# Patient Record
Sex: FEMALE | Race: OTHER | HISPANIC OR LATINO | URBAN - METROPOLITAN AREA
[De-identification: names, ages, dates, MRNs, and addresses within clinical notes are randomized per-mention and may not be internally consistent; named-entity substitution may affect disease eponyms.]

---

## 2019-01-23 ENCOUNTER — OUTPATIENT (OUTPATIENT)
Dept: OUTPATIENT SERVICES | Facility: HOSPITAL | Age: 39
LOS: 1 days | Discharge: HOME | End: 2019-01-23

## 2019-01-23 DIAGNOSIS — M25.561 PAIN IN RIGHT KNEE: ICD-10-CM

## 2021-05-03 PROBLEM — Z00.00 ENCOUNTER FOR PREVENTIVE HEALTH EXAMINATION: Status: ACTIVE | Noted: 2021-05-03

## 2021-05-04 ENCOUNTER — NON-APPOINTMENT (OUTPATIENT)
Age: 41
End: 2021-05-04

## 2021-05-05 ENCOUNTER — NON-APPOINTMENT (OUTPATIENT)
Age: 41
End: 2021-05-05

## 2021-05-05 ENCOUNTER — RESULT CHARGE (OUTPATIENT)
Age: 41
End: 2021-05-05

## 2021-05-05 ENCOUNTER — OUTPATIENT (OUTPATIENT)
Dept: OUTPATIENT SERVICES | Facility: HOSPITAL | Age: 41
LOS: 1 days | Discharge: HOME | End: 2021-05-05

## 2021-05-05 ENCOUNTER — APPOINTMENT (OUTPATIENT)
Dept: OBGYN | Facility: CLINIC | Age: 41
End: 2021-05-05
Payer: MEDICAID

## 2021-05-05 VITALS
BODY MASS INDEX: 29.64 KG/M2 | DIASTOLIC BLOOD PRESSURE: 90 MMHG | SYSTOLIC BLOOD PRESSURE: 134 MMHG | WEIGHT: 147 LBS | HEIGHT: 59 IN

## 2021-05-05 DIAGNOSIS — Z34.90 ENCOUNTER FOR SUPERVISION OF NORMAL PREGNANCY, UNSPECIFIED, UNSPECIFIED TRIMESTER: ICD-10-CM

## 2021-05-05 LAB
BILIRUB UR QL STRIP: NORMAL
CLARITY UR: CLEAR
COLLECTION METHOD: NORMAL
GLUCOSE BLDC GLUCOMTR-MCNC: 149
GLUCOSE BLDC GLUCOMTR-MCNC: 149 MG/DL — HIGH (ref 70–99)
GLUCOSE UR-MCNC: 100
HCG UR QL: 0.2 EU/DL
HCG UR QL: POSITIVE
HGB UR QL STRIP.AUTO: NORMAL
KETONES UR-MCNC: NORMAL
LEUKOCYTE ESTERASE UR QL STRIP: NORMAL
NITRITE UR QL STRIP: NORMAL
PH UR STRIP: 6
PROT UR STRIP-MCNC: NORMAL
QUALITY CONTROL: YES
SP GR UR STRIP: 1.02

## 2021-05-05 PROCEDURE — 76815 OB US LIMITED FETUS(S): CPT | Mod: 26

## 2021-05-05 PROCEDURE — 99204 OFFICE O/P NEW MOD 45 MIN: CPT | Mod: 25

## 2021-05-05 RX ORDER — ASPIRIN 81 MG/1
81 TABLET, CHEWABLE ORAL DAILY
Qty: 60 | Refills: 4 | Status: ACTIVE | COMMUNITY
Start: 2021-05-05 | End: 1900-01-01

## 2021-05-06 LAB
ABO + RH PNL BLD: NORMAL
BASOPHILS # BLD AUTO: 0.02 K/UL
BASOPHILS NFR BLD AUTO: 0.3 %
BLD GP AB SCN SERPL QL: NORMAL
C TRACH RRNA SPEC QL NAA+PROBE: NOT DETECTED
EOSINOPHIL # BLD AUTO: 0.08 K/UL
EOSINOPHIL NFR BLD AUTO: 1.1 %
GLUCOSE 1H P 50 G GLC PO SERPL-MCNC: 209 MG/DL
HCT VFR BLD CALC: 39.3 %
HGB BLD-MCNC: 12.9 G/DL
IMM GRANULOCYTES NFR BLD AUTO: 0.1 %
LYMPHOCYTES # BLD AUTO: 1.4 K/UL
LYMPHOCYTES NFR BLD AUTO: 18.9 %
MAN DIFF?: NORMAL
MCHC RBC-ENTMCNC: 29.6 PG
MCHC RBC-ENTMCNC: 32.8 G/DL
MCV RBC AUTO: 90.1 FL
MONOCYTES # BLD AUTO: 0.59 K/UL
MONOCYTES NFR BLD AUTO: 8 %
N GONORRHOEA RRNA SPEC QL NAA+PROBE: NOT DETECTED
NEUTROPHILS # BLD AUTO: 5.32 K/UL
NEUTROPHILS NFR BLD AUTO: 71.6 %
PLATELET # BLD AUTO: 256 K/UL
RBC # BLD: 4.36 M/UL
RBC # FLD: 13.9 %
SOURCE AMPLIFICATION: NORMAL
WBC # FLD AUTO: 7.42 K/UL

## 2021-05-07 ENCOUNTER — NON-APPOINTMENT (OUTPATIENT)
Age: 41
End: 2021-05-07

## 2021-05-07 LAB
HBV SURFACE AG SER QL: NONREACTIVE
HGB A MFR BLD: 97.4 %
HGB A2 MFR BLD: 2.6 %
HGB FRACT BLD-IMP: NORMAL
HIV1+2 AB SPEC QL IA.RAPID: NONREACTIVE
HPV HIGH+LOW RISK DNA PNL CVX: NOT DETECTED
LEAD BLD-MCNC: 2 UG/DL
MEV IGG FLD QL IA: 18.7 AU/ML
MEV IGG+IGM SER-IMP: POSITIVE
RUBV IGG FLD-ACNC: 5.8 INDEX
RUBV IGG SER-IMP: POSITIVE
T PALLIDUM AB SER QL IA: NEGATIVE
VZV AB TITR SER: POSITIVE
VZV IGG SER IF-ACNC: 170.6 INDEX

## 2021-05-09 LAB
BACTERIA UR CULT: ABNORMAL
M TB IFN-G BLD-IMP: NEGATIVE
QUANTIFERON TB PLUS MITOGEN MINUS NIL: 8.15 IU/ML
QUANTIFERON TB PLUS NIL: 0.02 IU/ML
QUANTIFERON TB PLUS TB1 MINUS NIL: 0 IU/ML
QUANTIFERON TB PLUS TB2 MINUS NIL: 0 IU/ML

## 2021-05-09 RX ORDER — AMPICILLIN 500 MG/1
500 CAPSULE ORAL 4 TIMES DAILY
Qty: 28 | Refills: 0 | Status: ACTIVE | COMMUNITY
Start: 2021-05-09 | End: 1900-01-01

## 2021-05-10 ENCOUNTER — NON-APPOINTMENT (OUTPATIENT)
Age: 41
End: 2021-05-10

## 2021-05-11 LAB — FMR1 GENE MUT ANL BLD/T: NORMAL

## 2021-05-12 ENCOUNTER — NON-APPOINTMENT (OUTPATIENT)
Age: 41
End: 2021-05-12

## 2021-05-13 ENCOUNTER — APPOINTMENT (OUTPATIENT)
Dept: ANTEPARTUM | Facility: CLINIC | Age: 41
End: 2021-05-13
Payer: MEDICAID

## 2021-05-13 ENCOUNTER — APPOINTMENT (OUTPATIENT)
Dept: ANTEPARTUM | Facility: CLINIC | Age: 41
End: 2021-05-13
Payer: SELF-PAY

## 2021-05-13 ENCOUNTER — OUTPATIENT (OUTPATIENT)
Dept: OUTPATIENT SERVICES | Facility: HOSPITAL | Age: 41
LOS: 1 days | Discharge: HOME | End: 2021-05-13

## 2021-05-13 ENCOUNTER — RESULT CHARGE (OUTPATIENT)
Age: 41
End: 2021-05-13

## 2021-05-13 ENCOUNTER — APPOINTMENT (OUTPATIENT)
Dept: NUTRITION | Facility: CLINIC | Age: 41
End: 2021-05-13

## 2021-05-13 VITALS
WEIGHT: 146.44 LBS | BODY MASS INDEX: 29.58 KG/M2 | DIASTOLIC BLOOD PRESSURE: 81 MMHG | SYSTOLIC BLOOD PRESSURE: 133 MMHG | HEART RATE: 99 BPM | OXYGEN SATURATION: 100 % | TEMPERATURE: 97.8 F

## 2021-05-13 DIAGNOSIS — Z78.9 OTHER SPECIFIED HEALTH STATUS: ICD-10-CM

## 2021-05-13 DIAGNOSIS — Z3A.15 15 WEEKS GESTATION OF PREGNANCY: ICD-10-CM

## 2021-05-13 DIAGNOSIS — O09.522 SUPERVISION OF ELDERLY MULTIGRAVIDA, SECOND TRIMESTER: ICD-10-CM

## 2021-05-13 DIAGNOSIS — O24.419 GESTATIONAL DIABETES MELLITUS IN PREGNANCY, UNSPECIFIED CONTROL: ICD-10-CM

## 2021-05-13 LAB
BILIRUB UR QL STRIP: NEGATIVE
CLARIM 15Q11.2: NORMAL
CLARIM 1P36: NORMAL
CLARIM 22Q11.2: NORMAL
CLARIM 4P-/WOLF-HIRSCHHORN: NORMAL
CLARIM 5P-/CRI DU CHAT: NORMAL
CLARIM ADDITIONAL INFO: NORMAL
CLARIM CHROMOSOME 13: NORMAL
CLARIM CHROMOSOME 18: NORMAL
CLARIM CHROMOSOME 21: NORMAL
CLARIM SEX CHROMOSOMES: NORMAL
CLARITEST NIPT W/MICRO: NORMAL
CLARITY UR: CLEAR
COLLECTION METHOD: NORMAL
FETAL HEART DESCRIPTION: NORMAL
FETAL HEART RATE (BPM): 142
FETAL MOVEMENT: PRESENT
GLUCOSE BLDC GLUCOMTR-MCNC: 173
GLUCOSE BLDC GLUCOMTR-MCNC: 173 MG/DL — HIGH (ref 70–99)
GLUCOSE UR-MCNC: NEGATIVE
HCG UR QL: 0.2 EU/DL
HGB UR QL STRIP.AUTO: NEGATIVE
KETONES UR-MCNC: NORMAL
LEUKOCYTE ESTERASE UR QL STRIP: NEGATIVE
NITRITE UR QL STRIP: NEGATIVE
OB COMMENTS: NORMAL
PH UR STRIP: 6
PROT UR STRIP-MCNC: NEGATIVE
SCHEDULED VISIT: YES
SP GR UR STRIP: 1.01
URINE ALBUMIN/PROTEIN: NEGATIVE
URINE GLUCOSE: NEGATIVE
URINE KETONES: NORMAL
WEEKS GESTATION: 15.6

## 2021-05-13 PROCEDURE — ZZZZZ: CPT

## 2021-05-13 PROCEDURE — 99244 OFF/OP CNSLTJ NEW/EST MOD 40: CPT | Mod: 25

## 2021-05-13 PROCEDURE — 76816 OB US FOLLOW-UP PER FETUS: CPT | Mod: 26

## 2021-05-13 RX ORDER — BLOOD SUGAR DIAGNOSTIC
STRIP MISCELLANEOUS
Qty: 120 | Refills: 0 | Status: ACTIVE | COMMUNITY
Start: 2021-05-13 | End: 1900-01-01

## 2021-05-13 RX ORDER — ISOPROPYL ALCOHOL 70 ML/100ML
SWAB TOPICAL
Qty: 30 | Refills: 2 | Status: ACTIVE | COMMUNITY
Start: 2021-05-13 | End: 1900-01-01

## 2021-05-13 RX ORDER — LANCETS 33 GAUGE
EACH MISCELLANEOUS
Qty: 120 | Refills: 5 | Status: ACTIVE | COMMUNITY
Start: 2021-05-13 | End: 1900-01-01

## 2021-05-13 RX ORDER — BLOOD-GLUCOSE METER
W/DEVICE EACH MISCELLANEOUS
Qty: 1 | Refills: 0 | Status: ACTIVE | COMMUNITY
Start: 2021-05-13 | End: 1900-01-01

## 2021-05-13 NOTE — DISCUSSION/SUMMARY
[FreeTextEntry1] : 41yo  at 15w6d, dated by LMP 21, AMILCAR 10/27/21 dated by LMP, consistent with 2nd TM sonogram, consult from Dr. Wheeler for Diabetes in pregnancy. \par \par 1. Diabetes in Pregnancy\par -FS today 173 (1hr postprandial) \par -Gestational vs Pregestational, Early GCT of 209 \par - diabetic teaching today\par -nutrition counseling apt to be scheduled \par - Discussed risks of GDM including but not limited to: macrosomia, shoulder dystocia, increased risk of , stillbirth, NICU admission for hypoglycemia and jaundice, and preeclampsia. Explained that if sugars are well controlled the above complications decrease. \par - FS goal fasting <95, 2 hour PP<120. Encouraged to document FS. \par - Discussed increased risk of type II DM later in life and that we will screen for that after pregnancy. \par \par 2. AMA \par - Discussed increased risk of developing GDM, preeclampsia, placental abruption, which could result in  birth or need for  delivery. \par - Discussed increased risk of genetic abnormalities, s/p low risk NIPTs\par - genetic counseling offered, declined \par \par 3. Questionable history of shoulder dystocia in P1\par \par 4. GBS Bacteruria\par -2021 positive urine culture GBS \par -taking ampicillin daily for UTI\par \par 5. Pregnancy \par -Taking ASA daily, continue \par -O POS\par [ ] official sonogram today \par [ ] recommend MSAFP \par [x] Follows with Dr. Wheeler, next apt 2021 \par \par PTL precautions given \par f/u in 2 weeks with finger sticks\par s/p fingerstick teaching today \par \par [place] Int #[id number]

## 2021-05-13 NOTE — OB HISTORY
[Pregnancy History] : boy [___] : Delivery occurred at [unfilled] [LMP: ___] : LMP: [unfilled] [AMILCAR: ___] : AMILCAR: [unfilled]

## 2021-05-13 NOTE — SURGICAL HISTORY
[Last Pap: ___] : Last Pap: [unfilled] [Fibroids] : no fibroids [Abn Paps] : no abnormal pap smears [Breast Disease] : no breast disease [STI's] : no STI's [Infertility] : no infertility

## 2021-05-13 NOTE — PHYSICAL EXAM
[FreeTextEntry1] : General: In no apparent distress. \par HEENT:  Atraumatic.  Extraocular muscles intact.  \par Thyroid: No goiter\par Cardiovascular: Regular rate and rhythm, normal S1/S2 \par Pulmonary: Clear to auscultation bilaterally.  No wheezing. \par Abdomen: soft, gravid, nontender, nondistended, no rebound, no guarding \par Extremities: no calf tenderness or edema \par Neurology: +2 reflexes in bilateral upper extremities \par Psychiatry:  Happy mood.  Awake, alert, oriented to person, place, time.

## 2021-05-13 NOTE — END OF VISIT
[FreeTextEntry3] : [facility] Staff\par \par \par [place] Int #[id number]\par \par I saw and evaluated Ms. HIGGINS with Dr. Gerard and I agree with the documentation above.   I modified the note above (if indicated) and agree with its contents in the present form.\par \par Counseling: \par \par 1.  The patient will be evaluated by a nutritionist and instructed in adhering to a diabetic diet.\par \par 2.  She was counseled by a nurse and instructed in capillary blood glucose testing (fasting and 2 hours after each meal).\par \par 3.  The significance and usual management of diabetes in pregnancy were reviewed, including risks of preeclampsia, Intra-Uterine Fetal Demise, macrosomia, birth trauma, pre-term labor,  section, NICU admission (the main reasons include  hypoglycemia and  jaundice) and the possible need for insulin therapy.\par \par 4.  Exercise was encouraged.  Ideally, she should walk briskly  after each meal.\par \par Recommendations:\par \par 1.  Glycemic Control.  Target glucose ranges to minimize excessive fetal growth are:  Fasting 60-90 mg/dl; preprandial  mg/dl; and 2-hour postprandial < 120 mg/dl.  If these values are elevated, insulin therapy is encouraged, although oral hypoglycemic agents are reasonable to try depending on the finger stick log.\par \par 2.  Antepartum testing.  Daily fetal movement counts are recommended from 28 weeks.  Weekly or twice weekly biophysical profiles are recommended, the timing will depend on glucose control.  Ultrasound assessment of fetal growth and macrosomic features is recommended.\par \par 3.  Timing of Delivery.  If spontaneous delivery has not occurred by 39 weeks gestation, delivery may be planned between 39-40 weeks.   If complications, such as preeclampsia, macrosomia or poor glucose control develop, then delivery plans may need to be revised.\par \par 4.  Route of delivery.  Diabetes is associated with about a six-fold risk for shoulder dystocia.  Operative vaginal deliveries should be approached with caution if at all.\par \par 5.  Glucose control in labor.  During labor, capillary glucose values should be checked every few hours (depending on their stability).  Insulin may be required to cover elevated glucose levels.  This was not discussed today with the patient.\par \par 6.  Long-term diabetes surveillance.  The risk of developing diabetes outside of pregnancy over the next five years may be as high as 50%.  I recommend that she have a fasting plasma glucose at 6 to 12 weeks postpartum and counseling about ways to minimize her risk.  If her fasting glucose is normal, annual glucose screening by her primary care physician is advised.\par \par FS today was 173.\par AMA - Declines genetic counseling.\par Prenatal care is with Dr. Wheeler.\par RTC 2 weeks with fingerstick log.\par \par Ms. Singh expressed understanding and all of her questions were answered to her satisfaction.  Thank you for this consult.\par \par Jovan Pham MD\par \par \par \par Ms. Higgins expressed understanding and all of her questions were answered to her satisfaction.  Thank you for this consult.\par \par Jovan Pham MD\par \par \par \par \par \par \par \par

## 2021-05-13 NOTE — HISTORY OF PRESENT ILLNESS
[FreeTextEntry1] : 39yo  at 15w6d, dated by LMP 21, consistent with 2nd TM sonogram, consult from Dr. Wheeler for Diabetes in pregnancy. \par \par Early GCT of 209\par \par [place] Int #[id number]

## 2021-05-14 DIAGNOSIS — O24.419 GESTATIONAL DIABETES MELLITUS IN PREGNANCY, UNSPECIFIED CONTROL: ICD-10-CM

## 2021-05-14 LAB — AR GENE MUT ANL BLD/T: NORMAL

## 2021-05-17 ENCOUNTER — APPOINTMENT (OUTPATIENT)
Dept: OBGYN | Facility: CLINIC | Age: 41
End: 2021-05-17

## 2021-05-17 ENCOUNTER — NON-APPOINTMENT (OUTPATIENT)
Age: 41
End: 2021-05-17

## 2021-05-19 LAB — CYTOLOGY CVX/VAG DOC THIN PREP: NORMAL

## 2021-05-22 LAB — CFTR MUT TESTED BLD/T: NEGATIVE

## 2021-05-26 ENCOUNTER — NON-APPOINTMENT (OUTPATIENT)
Age: 41
End: 2021-05-26

## 2021-05-27 ENCOUNTER — OUTPATIENT (OUTPATIENT)
Dept: OUTPATIENT SERVICES | Facility: HOSPITAL | Age: 41
LOS: 1 days | Discharge: HOME | End: 2021-05-27

## 2021-05-27 ENCOUNTER — APPOINTMENT (OUTPATIENT)
Dept: OBGYN | Facility: CLINIC | Age: 41
End: 2021-05-27

## 2021-05-27 ENCOUNTER — APPOINTMENT (OUTPATIENT)
Dept: ANTEPARTUM | Facility: CLINIC | Age: 41
End: 2021-05-27
Payer: MEDICAID

## 2021-05-27 ENCOUNTER — RESULT CHARGE (OUTPATIENT)
Age: 41
End: 2021-05-27

## 2021-05-27 VITALS
SYSTOLIC BLOOD PRESSURE: 103 MMHG | TEMPERATURE: 98.2 F | HEART RATE: 80 BPM | DIASTOLIC BLOOD PRESSURE: 65 MMHG | BODY MASS INDEX: 28.96 KG/M2 | OXYGEN SATURATION: 100 % | WEIGHT: 143.38 LBS

## 2021-05-27 DIAGNOSIS — O24.410 GESTATIONAL DIABETES MELLITUS IN PREGNANCY, DIET CONTROLLED: ICD-10-CM

## 2021-05-27 DIAGNOSIS — Z3A.18 18 WEEKS GESTATION OF PREGNANCY: ICD-10-CM

## 2021-05-27 LAB
BILIRUB UR QL STRIP: NEGATIVE
CLARITY UR: CLEAR
COLLECTION METHOD: NORMAL
FETAL HEART DESCRIPTION: NORMAL
FETAL HEART RATE (BPM): 140
GLUCOSE BLDC GLUCOMTR-MCNC: 85
GLUCOSE BLDC GLUCOMTR-MCNC: 85 MG/DL — SIGNIFICANT CHANGE UP (ref 70–99)
GLUCOSE UR-MCNC: NEGATIVE
HCG UR QL: 0.2 EU/DL
HGB UR QL STRIP.AUTO: NEGATIVE
KETONES UR-MCNC: NEGATIVE
LEUKOCYTE ESTERASE UR QL STRIP: NEGATIVE
NITRITE UR QL STRIP: NEGATIVE
OB COMMENTS: NORMAL
PH UR STRIP: 7.5
PROT UR STRIP-MCNC: NEGATIVE
SCHEDULED VISIT: YES
SP GR UR STRIP: 1
URINE ALBUMIN/PROTEIN: NEGATIVE
URINE GLUCOSE: NEGATIVE
URINE KETONES: NEGATIVE
WEEKS GESTATION: 18.1

## 2021-05-27 PROCEDURE — 99213 OFFICE O/P EST LOW 20 MIN: CPT

## 2021-05-27 NOTE — DISCUSSION/SUMMARY
[FreeTextEntry1] :  number [employer] [id number]\par \par 1. Diabetes in Pregnancy\par -FS today 85\par - FS log , all normal except for 1 value, 2h PP , almost all normal\par -Gestational vs Pregestational, Early GCT of 209 \par - diabetic teaching today with Tyra\par -nutrition counseling apt to be scheduled \par - Discussed risks of GDM including but not limited to: macrosomia, shoulder dystocia, increased risk of , stillbirth, NICU admission for hypoglycemia and jaundice, and preeclampsia. Explained that if sugars are well controlled the above complications decrease. \par - FS goal fasting <95, 2 hour PP<120. Encouraged to document FS. \par - Discussed increased risk of type II DM later in life and that we will screen for that after pregnancy. \par \par 2. AMA \par - Discussed increased risk of developing GDM, preeclampsia, placental abruption, which could result in  birth or need for  delivery. \par - Discussed increased risk of genetic abnormalities, s/p low risk NIPTs\par - genetic counseling offered, declined \par \par 3. Questionable history of shoulder dystocia in P1\par \par 4. Pregnancy \par -Taking ASA 81 mg PO daily, continue \par GBS bacteruria\par recommend MSAFP \par Follows with Dr. Wheeler, next apt 2021 \par Anatomy scan scheduled for \par \par PTL precautions given \par s/p fingerstick teaching today \par May return to [facility] on  with her anatomy scan\par \par

## 2021-05-27 NOTE — PHYSICAL EXAM
[FreeTextEntry1] : General: In no apparent distress. \par HEENT:  Atraumatic.  \par Cardiovascular: Regular rate and rhythm, normal S1/S2 \par Pulmonary: Clear to auscultation bilaterally.  No wheezing. \par Abdomen: soft, gravid, nontender, nondistended, no rebound, no guarding \par Extremities: no calf tenderness or edema \par Neurology: +2 reflexes in bilateral upper extremities \par Psychiatry:  Happy mood.  Awake, alert, oriented to person, place, time.

## 2021-05-27 NOTE — HISTORY OF PRESENT ILLNESS
[FreeTextEntry1] : 41yo  at 18w1d, dated by LMP 21, consistent with 2nd trimester sonogram, referred by Dr. Wheeler for Diabetes in pregnancy. \par \par Early GCT of 209\par \par

## 2021-06-02 ENCOUNTER — OUTPATIENT (OUTPATIENT)
Dept: OUTPATIENT SERVICES | Facility: HOSPITAL | Age: 41
LOS: 1 days | Discharge: HOME | End: 2021-06-02

## 2021-06-02 ENCOUNTER — NON-APPOINTMENT (OUTPATIENT)
Age: 41
End: 2021-06-02

## 2021-06-02 ENCOUNTER — RESULT CHARGE (OUTPATIENT)
Age: 41
End: 2021-06-02

## 2021-06-02 ENCOUNTER — APPOINTMENT (OUTPATIENT)
Dept: OBGYN | Facility: CLINIC | Age: 41
End: 2021-06-02
Payer: MEDICAID

## 2021-06-02 VITALS — BODY MASS INDEX: 28.68 KG/M2 | DIASTOLIC BLOOD PRESSURE: 60 MMHG | WEIGHT: 142 LBS | SYSTOLIC BLOOD PRESSURE: 100 MMHG

## 2021-06-02 LAB
BILIRUB UR QL STRIP: NEGATIVE
CLARITY UR: CLEAR
COLLECTION METHOD: NORMAL
GLUCOSE UR-MCNC: NEGATIVE
HCG UR QL: NEGATIVE EU/DL
HGB UR QL STRIP.AUTO: NEGATIVE
KETONES UR-MCNC: NORMAL
LEUKOCYTE ESTERASE UR QL STRIP: NEGATIVE
NITRITE UR QL STRIP: NEGATIVE
PH UR STRIP: 6
PROT UR STRIP-MCNC: NEGATIVE
SP GR UR STRIP: 1.02

## 2021-06-02 PROCEDURE — 99213 OFFICE O/P EST LOW 20 MIN: CPT

## 2021-06-03 ENCOUNTER — LABORATORY RESULT (OUTPATIENT)
Age: 41
End: 2021-06-03

## 2021-06-03 ENCOUNTER — APPOINTMENT (OUTPATIENT)
Dept: ANTEPARTUM | Facility: CLINIC | Age: 41
End: 2021-06-03

## 2021-06-15 DIAGNOSIS — Z34.90 ENCOUNTER FOR SUPERVISION OF NORMAL PREGNANCY, UNSPECIFIED, UNSPECIFIED TRIMESTER: ICD-10-CM

## 2021-06-18 ENCOUNTER — ASOB RESULT (OUTPATIENT)
Age: 41
End: 2021-06-18

## 2021-06-18 ENCOUNTER — RESULT CHARGE (OUTPATIENT)
Age: 41
End: 2021-06-18

## 2021-06-18 ENCOUNTER — APPOINTMENT (OUTPATIENT)
Dept: ANTEPARTUM | Facility: CLINIC | Age: 41
End: 2021-06-18
Payer: MEDICAID

## 2021-06-18 ENCOUNTER — OUTPATIENT (OUTPATIENT)
Dept: OUTPATIENT SERVICES | Facility: HOSPITAL | Age: 41
LOS: 1 days | Discharge: HOME | End: 2021-06-18

## 2021-06-18 VITALS
HEIGHT: 59 IN | WEIGHT: 142 LBS | SYSTOLIC BLOOD PRESSURE: 129 MMHG | OXYGEN SATURATION: 100 % | BODY MASS INDEX: 28.63 KG/M2 | DIASTOLIC BLOOD PRESSURE: 80 MMHG | TEMPERATURE: 98.2 F | HEART RATE: 99 BPM

## 2021-06-18 LAB
BILIRUB UR QL STRIP: NORMAL
CLARITY UR: CLEAR
COLLECTION METHOD: NORMAL
FETAL HEART RATE (BPM): 140
FETAL MOVEMENT: PRESENT
GLUCOSE BLDC GLUCOMTR-MCNC: 135
GLUCOSE BLDC GLUCOMTR-MCNC: 135 MG/DL — HIGH (ref 70–99)
GLUCOSE UR-MCNC: NORMAL
HCG UR QL: 0.2 EU/DL
HGB UR QL STRIP.AUTO: NORMAL
KETONES UR-MCNC: NORMAL
LEUKOCYTE ESTERASE UR QL STRIP: NORMAL
NITRITE UR QL STRIP: NORMAL
OB COMMENTS: NORMAL
PH UR STRIP: 6
PROT UR STRIP-MCNC: NORMAL
SP GR UR STRIP: 10.1
URINE ALBUMIN/PROTEIN: NORMAL
URINE GLUCOSE: NORMAL
URINE KETONES: NORMAL
WEEKS GESTATION: NORMAL

## 2021-06-18 PROCEDURE — 76811 OB US DETAILED SNGL FETUS: CPT | Mod: 26

## 2021-06-18 PROCEDURE — ZZZZZ: CPT

## 2021-06-18 PROCEDURE — 76817 TRANSVAGINAL US OBSTETRIC: CPT | Mod: 26

## 2021-06-18 PROCEDURE — 99214 OFFICE O/P EST MOD 30 MIN: CPT | Mod: 25

## 2021-06-18 NOTE — DISCUSSION/SUMMARY
[FreeTextEntry1] :  number [place] [id number] ([employer] [facility] unavailable despite an appointment being scheduled 24 hours prior to pt's appointment due to equipment problem.\par \par 40 year old  G 3 P 2 0 0 2 at 21 weeks 0 days, dated by LMP 21, EDC 10/28/21 consistent with 2nd trimester sonogram, here for follow-up of  abnormal gct (). We are treating her as gestational diabetic., co-managed with Dr. Wheeler\par \par # Diabetes in Pregnancy\par -FS today 135 (1 hr PP)\par -FS log reviewed: \par   Fastin-91\par   2hr PP: \par   30 day average (according to glucometer): 93\par True matrix glucometer doesn't have 4 digits.Upon further questioning patient does not know how to write. All values we see are normal.\par -Gestational vs Pregestational, Early GCT of 209 \par -s/p diabetic teaching\par -nutrition counseling apt to be scheduled. Discussed with patient that she needs to avoid fruit to prevent her FS from increasing \par - Discussed risks of GDM including but not limited to: macrosomia, shoulder dystocia, increased risk of , stillbirth, NICU admission for hypoglycemia and jaundice, and preeclampsia. Explained that if sugars are well controlled the above complications decrease. \par - FS goal fasting <95, 2 hour PP<120. Encouraged to document FS. \par - Discussed increased risk of type II DM later in life and that we will screen for that after pregnancy. \par \par # AMA \par - Discussed increased risk of developing GDM, preeclampsia, placental abruption, which could result in  birth or need for  delivery. \par - Discussed increased risk of genetic abnormalities, s/p low risk NIPTs\par - genetic counseling offered, declined \par \par # Questionable history of shoulder dystocia in P1\par -discussed low recurrence risk of approx 10% in subsequent pregnancies if fetal macrosomia is not present\par \par #Pregnancy \par -Taking ASA 81 mg PO daily\par -PNV\par GBS bacteruria\par -recommend MSAFP \par -Anatomy scan scheduled for today, f/u results\par -follow with Dr. Wheeler for routine prenatal care\par \par #illiteracy \par -using , pt states that she cannot read or write which is consistent with the numerous errors on her FS log compared to the glucometer readings\par -FS logs will now be based solely upon the glucometer readings. Pt instructed to bring her glucometer with her to every prental visits so providers can review it appropriately \par \par Discussed  labor precautions, fetal kick count instructions, and PO maternal hydration. RTC in 4 weeks with FS log\par \par \par Dr. Terry aware\par \par

## 2021-06-18 NOTE — HISTORY OF PRESENT ILLNESS
[FreeTextEntry1] : 41yo  at 21w0d, dated by LMP 21, consistent with 2nd trimester sonogram, here for follow-up of GDMA versus pre-gestational diabetes, co-managed with Dr. Wheeler\par \par Reports good fetal movement. Denies vaginal bleeding, leakage of fluid, or contractions. \par \par FS log reviewed: \par Fastin-91\par 2hr PP: \par 30 day average (according to glucometer): 93\par During review of pt's written fingerstick log, it was determined that patient is illiterate and her FS were reviewed from her glucometer. \par \par \par Early GCT of 209\par \par

## 2021-06-18 NOTE — END OF VISIT
[] : Resident [FreeTextEntry3] : Translation problems. Patient apparently has limited literacy. She is checking blood sugars 4 times a day. Date and time on the machine have been corrected. Values almost all normal. [Time Spent: ___ minutes] : I have spent [unfilled] minutes of time on the encounter.

## 2021-06-21 DIAGNOSIS — O99.810 ABNORMAL GLUCOSE COMPLICATING PREGNANCY: ICD-10-CM

## 2021-06-21 DIAGNOSIS — Z3A.20 20 WEEKS GESTATION OF PREGNANCY: ICD-10-CM

## 2021-06-21 DIAGNOSIS — O09.521 SUPERVISION OF ELDERLY MULTIGRAVIDA, FIRST TRIMESTER: ICD-10-CM

## 2021-06-21 DIAGNOSIS — O09.90 SUPERVISION OF HIGH RISK PREGNANCY, UNSPECIFIED, UNSPECIFIED TRIMESTER: ICD-10-CM

## 2021-06-30 ENCOUNTER — RESULT CHARGE (OUTPATIENT)
Age: 41
End: 2021-06-30

## 2021-06-30 ENCOUNTER — NON-APPOINTMENT (OUTPATIENT)
Age: 41
End: 2021-06-30

## 2021-06-30 ENCOUNTER — OUTPATIENT (OUTPATIENT)
Dept: OUTPATIENT SERVICES | Facility: HOSPITAL | Age: 41
LOS: 1 days | Discharge: HOME | End: 2021-06-30

## 2021-06-30 ENCOUNTER — APPOINTMENT (OUTPATIENT)
Dept: OBGYN | Facility: CLINIC | Age: 41
End: 2021-06-30
Payer: MEDICAID

## 2021-06-30 VITALS
BODY MASS INDEX: 28.22 KG/M2 | WEIGHT: 140 LBS | DIASTOLIC BLOOD PRESSURE: 60 MMHG | HEIGHT: 59 IN | SYSTOLIC BLOOD PRESSURE: 100 MMHG

## 2021-06-30 LAB
BILIRUB UR QL STRIP: NORMAL
CLARITY UR: CLEAR
COLLECTION METHOD: NORMAL
GLUCOSE UR-MCNC: NORMAL
HCG UR QL: 0.2 EU/DL
HGB UR QL STRIP.AUTO: NORMAL
KETONES UR-MCNC: NORMAL
LEUKOCYTE ESTERASE UR QL STRIP: NORMAL
NITRITE UR QL STRIP: NORMAL
PH UR STRIP: 6.5
PROT UR STRIP-MCNC: NORMAL
SP GR UR STRIP: 1.01

## 2021-06-30 PROCEDURE — 99213 OFFICE O/P EST LOW 20 MIN: CPT

## 2021-07-13 ENCOUNTER — OUTPATIENT (OUTPATIENT)
Dept: OUTPATIENT SERVICES | Facility: HOSPITAL | Age: 41
LOS: 1 days | Discharge: HOME | End: 2021-07-13

## 2021-07-13 ENCOUNTER — APPOINTMENT (OUTPATIENT)
Dept: ANTEPARTUM | Facility: CLINIC | Age: 41
End: 2021-07-13
Payer: MEDICAID

## 2021-07-13 ENCOUNTER — RESULT CHARGE (OUTPATIENT)
Age: 41
End: 2021-07-13

## 2021-07-13 VITALS
OXYGEN SATURATION: 100 % | WEIGHT: 143 LBS | DIASTOLIC BLOOD PRESSURE: 79 MMHG | SYSTOLIC BLOOD PRESSURE: 129 MMHG | TEMPERATURE: 97.9 F | HEART RATE: 95 BPM | BODY MASS INDEX: 28.88 KG/M2

## 2021-07-13 LAB
BILIRUB UR QL STRIP: NEGATIVE
CLARITY UR: CLEAR
COLLECTION METHOD: NORMAL
FETAL HEART DESCRIPTION: NORMAL
FETAL HEART RATE (BPM): 136
FETAL MOVEMENT: PRESENT
GLUCOSE BLDC GLUCOMTR-MCNC: 127
GLUCOSE BLDC GLUCOMTR-MCNC: 127 MG/DL — HIGH (ref 70–99)
GLUCOSE UR-MCNC: NEGATIVE
HCG UR QL: 0.2 EU/DL
HGB UR QL STRIP.AUTO: NEGATIVE
KETONES UR-MCNC: NEGATIVE
LEUKOCYTE ESTERASE UR QL STRIP: NEGATIVE
NITRITE UR QL STRIP: NEGATIVE
OB COMMENTS: NORMAL
PH UR STRIP: 7
PROT UR STRIP-MCNC: NEGATIVE
SCHEDULED VISIT: YES
SP GR UR STRIP: 1
URINE ALBUMIN/PROTEIN: NEGATIVE
URINE GLUCOSE: NEGATIVE
URINE KETONES: NEGATIVE
WEEKS GESTATION: 24.4

## 2021-07-13 PROCEDURE — 99214 OFFICE O/P EST MOD 30 MIN: CPT

## 2021-07-13 NOTE — HISTORY OF PRESENT ILLNESS
[FreeTextEntry1] : 41yo  at 24w4d, dated by LMP 21, consistent with 2nd trimester sonogram, here for follow-up of GDMA versus pre-gestational diabetes, co-managed with Dr. Wheeler\par \par Reports good fetal movement. Denies vaginal bleeding, leakage of fluid, or contractions. \par \par FS log reviewed: \par Fastin-91\par 2hr PP: \par 30 day average (according to glucometer): 93\par During review of pt's written fingerstick log, it was determined that patient is illiterate and her FS were reviewed from her glucometer. \par \par \par Early GCT of 209\par \par

## 2021-07-13 NOTE — DISCUSSION/SUMMARY
[FreeTextEntry1] :  number [place] [employer] [id number] ([employer] [facility] unavailable despite an appointment being scheduled 24 hours prior to pt's appointment due to equipment problem.\par \par 40 year old  G 3 P 2 0 0 2 at 24 weeks40 days, dated by LMP 21, EDC 10/28/21 consistent with 2nd trimester sonogram, here for follow-up of  abnormal gct (). We are treating her as gestational diabetic., co-managed with Dr. Wheeler\par \par # Diabetes in Pregnancy\par -FS today 135 (1 hr PP)\par -FS log reviewed: \par   Fastin-104\par   2hr PP: \par \par Most within normal limits.\par \par Most of the values agree with those on the glucometer\par \par # AMA \par \par # Questionable history of shoulder dystocia in P1\par \par \par #Pregnancy \par -Taking ASA 81 mg PO daily\par -PNV\par GBS bacteruria\par -recommend MSAFP \par -Anatomy scan scheduled for today, f/u results\par -follow with Dr. Wheeler for routine prenatal care\par \par #she has sole pain and burning with walking bilaterally\par - will refer for Podiatry\par \par #illiteracy (from previous note)\par -using , pt states that she cannot read or write which is consistent with the numerous errors on her FS log compared to the glucometer readings\par -FS logs will now be based solely upon the glucometer readings. Pt instructed to bring her glucometer with her to every prenatal visits so providers can review it appropriately \par \par Discussed  labor precautions, fetal kick count instructions, and PO maternal hydration. \par \par Podiatry referral\par EFW monthly.\par RTC in 4 weeks with FS log\par \par \par Jovan Pham MD\par \par 30 minutes were spent on this encounter\par \par \par

## 2021-07-14 DIAGNOSIS — Z3A.24 24 WEEKS GESTATION OF PREGNANCY: ICD-10-CM

## 2021-07-14 DIAGNOSIS — O09.522 SUPERVISION OF ELDERLY MULTIGRAVIDA, SECOND TRIMESTER: ICD-10-CM

## 2021-07-14 DIAGNOSIS — O24.410 GESTATIONAL DIABETES MELLITUS IN PREGNANCY, DIET CONTROLLED: ICD-10-CM

## 2021-07-14 DIAGNOSIS — M79.673 PAIN IN UNSPECIFIED FOOT: ICD-10-CM

## 2021-07-15 RX ORDER — BLOOD SUGAR DIAGNOSTIC
STRIP MISCELLANEOUS 4 TIMES DAILY
Qty: 120 | Refills: 6 | Status: ACTIVE | COMMUNITY
Start: 2021-07-15 | End: 1900-01-01

## 2021-07-21 ENCOUNTER — OUTPATIENT (OUTPATIENT)
Dept: OUTPATIENT SERVICES | Facility: HOSPITAL | Age: 41
LOS: 1 days | Discharge: HOME | End: 2021-07-21

## 2021-07-21 ENCOUNTER — APPOINTMENT (OUTPATIENT)
Dept: PODIATRY | Facility: CLINIC | Age: 41
End: 2021-07-21
Payer: MEDICAID

## 2021-07-21 DIAGNOSIS — E11.49 TYPE 2 DIABETES MELLITUS WITH OTHER DIABETIC NEUROLOGICAL COMPLICATION: ICD-10-CM

## 2021-07-21 DIAGNOSIS — M79.673 PAIN IN UNSPECIFIED FOOT: ICD-10-CM

## 2021-07-21 PROCEDURE — 99203 OFFICE O/P NEW LOW 30 MIN: CPT | Mod: NC

## 2021-07-21 NOTE — PHYSICAL EXAM
[Ankle Swelling Bilaterally] : bilaterally  [Varicose Veins Of Lower Extremities] : bilaterally [] : on both lower extremities [2+] : left foot dorsalis pedis 2+ [Normal Foot/Ankle] : Both lower extremities were exposed and visualized. Standing exam demonstrates normal foot posture and alignment. Hindfoot exam shows no hindfoot valgus or varus [Skin Color & Pigmentation] : normal skin color and pigmentation [Diminished Throughout Right Foot] : diminished sensation with monofilament testing throughout right foot [Diminished Throughout Left Foot] : diminished sensation with monofilament testing throughout left foot

## 2021-07-28 ENCOUNTER — NON-APPOINTMENT (OUTPATIENT)
Age: 41
End: 2021-07-28

## 2021-07-28 ENCOUNTER — RESULT CHARGE (OUTPATIENT)
Age: 41
End: 2021-07-28

## 2021-07-28 ENCOUNTER — OUTPATIENT (OUTPATIENT)
Dept: OUTPATIENT SERVICES | Facility: HOSPITAL | Age: 41
LOS: 1 days | Discharge: HOME | End: 2021-07-28

## 2021-07-28 ENCOUNTER — ASOB RESULT (OUTPATIENT)
Age: 41
End: 2021-07-28

## 2021-07-28 ENCOUNTER — APPOINTMENT (OUTPATIENT)
Dept: ANTEPARTUM | Facility: CLINIC | Age: 41
End: 2021-07-28
Payer: MEDICAID

## 2021-07-28 ENCOUNTER — APPOINTMENT (OUTPATIENT)
Dept: OBGYN | Facility: CLINIC | Age: 41
End: 2021-07-28
Payer: MEDICAID

## 2021-07-28 VITALS — WEIGHT: 142 LBS | DIASTOLIC BLOOD PRESSURE: 64 MMHG | BODY MASS INDEX: 28.68 KG/M2 | SYSTOLIC BLOOD PRESSURE: 110 MMHG

## 2021-07-28 DIAGNOSIS — K64.9 UNSPECIFIED HEMORRHOIDS: ICD-10-CM

## 2021-07-28 DIAGNOSIS — O99.810 ABNORMAL GLUCOSE COMPLICATING PREGNANCY: ICD-10-CM

## 2021-07-28 LAB
BILIRUB UR QL STRIP: NORMAL
CLARITY UR: CLEAR
COLLECTION METHOD: NORMAL
GLUCOSE UR-MCNC: NORMAL
HCG UR QL: 0.2 EU/DL
HGB UR QL STRIP.AUTO: NORMAL
KETONES UR-MCNC: NORMAL
LEUKOCYTE ESTERASE UR QL STRIP: NORMAL
NITRITE UR QL STRIP: NORMAL
PH UR STRIP: 7.5
PROT UR STRIP-MCNC: NORMAL
SP GR UR STRIP: 1.01

## 2021-07-28 PROCEDURE — 76816 OB US FOLLOW-UP PER FETUS: CPT | Mod: 26

## 2021-07-28 PROCEDURE — 99213 OFFICE O/P EST LOW 20 MIN: CPT

## 2021-07-28 RX ORDER — PHENYLEPHRINE HCL, WITCH HAZEL 2.5; 5 MG/G; MG/G
0.25-5 GEL TOPICAL TWICE DAILY
Qty: 1 | Refills: 0 | Status: ACTIVE | COMMUNITY
Start: 2021-07-28 | End: 1900-01-01

## 2021-07-29 DIAGNOSIS — Z3A.24 24 WEEKS GESTATION OF PREGNANCY: ICD-10-CM

## 2021-07-29 DIAGNOSIS — O09.90 SUPERVISION OF HIGH RISK PREGNANCY, UNSPECIFIED, UNSPECIFIED TRIMESTER: ICD-10-CM

## 2021-07-29 DIAGNOSIS — Z36.89 ENCOUNTER FOR OTHER SPECIFIED ANTENATAL SCREENING: ICD-10-CM

## 2021-07-29 DIAGNOSIS — O24.919 UNSPECIFIED DIABETES MELLITUS IN PREGNANCY, UNSPECIFIED TRIMESTER: ICD-10-CM

## 2021-07-29 DIAGNOSIS — O09.521 SUPERVISION OF ELDERLY MULTIGRAVIDA, FIRST TRIMESTER: ICD-10-CM

## 2021-07-29 DIAGNOSIS — O26.849 UTERINE SIZE-DATE DISCREPANCY, UNSPECIFIED TRIMESTER: ICD-10-CM

## 2021-07-30 PROBLEM — E11.49 TYPE 2 DIABETES MELLITUS WITH OTHER NEUROLOGIC COMPLICATION: Status: ACTIVE | Noted: 2021-07-21

## 2021-07-30 PROBLEM — M79.673 FOOT PAIN: Status: ACTIVE | Noted: 2021-07-13

## 2021-07-30 NOTE — HISTORY OF PRESENT ILLNESS
[FreeTextEntry1] : Pt 41 F D2 presents with b/l foot pain. PT states both her feet have been hurting for the past 5  months.

## 2021-07-30 NOTE — ASSESSMENT
[FreeTextEntry1] : 41 F presents for diabetic foot check up\par - Pt was given Rx: for diclofenac cream\par - Pt was told to follow up with Endocrinologist for dubieties management. \par - Return to clinic in 2 months. \par

## 2021-08-11 ENCOUNTER — OUTPATIENT (OUTPATIENT)
Dept: OUTPATIENT SERVICES | Facility: HOSPITAL | Age: 41
LOS: 1 days | Discharge: HOME | End: 2021-08-11

## 2021-08-11 ENCOUNTER — APPOINTMENT (OUTPATIENT)
Dept: ANTEPARTUM | Facility: CLINIC | Age: 41
End: 2021-08-11
Payer: MEDICAID

## 2021-08-11 ENCOUNTER — RESULT CHARGE (OUTPATIENT)
Age: 41
End: 2021-08-11

## 2021-08-11 VITALS
WEIGHT: 141 LBS | BODY MASS INDEX: 28.43 KG/M2 | TEMPERATURE: 98.4 F | HEIGHT: 59 IN | HEART RATE: 89 BPM | SYSTOLIC BLOOD PRESSURE: 103 MMHG | DIASTOLIC BLOOD PRESSURE: 66 MMHG | OXYGEN SATURATION: 98 %

## 2021-08-11 DIAGNOSIS — O09.90 SUPERVISION OF HIGH RISK PREGNANCY, UNSPECIFIED, UNSPECIFIED TRIMESTER: ICD-10-CM

## 2021-08-11 DIAGNOSIS — Z3A.28 28 WEEKS GESTATION OF PREGNANCY: ICD-10-CM

## 2021-08-11 DIAGNOSIS — O24.919 UNSPECIFIED DIABETES MELLITUS IN PREGNANCY, UNSPECIFIED TRIMESTER: ICD-10-CM

## 2021-08-11 DIAGNOSIS — O09.521 SUPERVISION OF ELDERLY MULTIGRAVIDA, FIRST TRIMESTER: ICD-10-CM

## 2021-08-11 LAB
BILIRUB UR QL STRIP: NORMAL
CLARITY UR: CLEAR
COLLECTION METHOD: NORMAL
FETAL HEART RATE (BPM): 132
FETAL MOVEMENT: PRESENT
GLUCOSE BLDC GLUCOMTR-MCNC: 83
GLUCOSE BLDC GLUCOMTR-MCNC: 83 MG/DL — SIGNIFICANT CHANGE UP (ref 70–99)
GLUCOSE UR-MCNC: NORMAL
HCG UR QL: 0.2 EU/DL
HGB UR QL STRIP.AUTO: NORMAL
KETONES UR-MCNC: NORMAL
LEUKOCYTE ESTERASE UR QL STRIP: NORMAL
NITRITE UR QL STRIP: NORMAL
OB COMMENTS: NORMAL
PH UR STRIP: 5
PROT UR STRIP-MCNC: NORMAL
SP GR UR STRIP: 1
URINE ALBUMIN/PROTEIN: NORMAL
URINE GLUCOSE: NORMAL
URINE KETONES: NORMAL
WEEKS GESTATION: NORMAL

## 2021-08-11 PROCEDURE — 99213 OFFICE O/P EST LOW 20 MIN: CPT

## 2021-08-25 ENCOUNTER — NON-APPOINTMENT (OUTPATIENT)
Age: 41
End: 2021-08-25

## 2021-08-25 ENCOUNTER — APPOINTMENT (OUTPATIENT)
Dept: OBGYN | Facility: CLINIC | Age: 41
End: 2021-08-25
Payer: MEDICAID

## 2021-08-25 ENCOUNTER — RESULT CHARGE (OUTPATIENT)
Age: 41
End: 2021-08-25

## 2021-08-25 ENCOUNTER — OUTPATIENT (OUTPATIENT)
Dept: OUTPATIENT SERVICES | Facility: HOSPITAL | Age: 41
LOS: 1 days | Discharge: HOME | End: 2021-08-25

## 2021-08-25 VITALS
HEIGHT: 59 IN | SYSTOLIC BLOOD PRESSURE: 100 MMHG | BODY MASS INDEX: 28.63 KG/M2 | WEIGHT: 142 LBS | DIASTOLIC BLOOD PRESSURE: 60 MMHG

## 2021-08-25 DIAGNOSIS — Z23 ENCOUNTER FOR IMMUNIZATION: ICD-10-CM

## 2021-08-25 PROCEDURE — 99213 OFFICE O/P EST LOW 20 MIN: CPT

## 2021-09-07 ENCOUNTER — NON-APPOINTMENT (OUTPATIENT)
Age: 41
End: 2021-09-07

## 2021-09-08 ENCOUNTER — APPOINTMENT (OUTPATIENT)
Dept: ANTEPARTUM | Facility: CLINIC | Age: 41
End: 2021-09-08
Payer: MEDICAID

## 2021-09-08 ENCOUNTER — RESULT CHARGE (OUTPATIENT)
Age: 41
End: 2021-09-08

## 2021-09-08 ENCOUNTER — APPOINTMENT (OUTPATIENT)
Dept: OBGYN | Facility: CLINIC | Age: 41
End: 2021-09-08
Payer: MEDICAID

## 2021-09-08 ENCOUNTER — OUTPATIENT (OUTPATIENT)
Dept: OUTPATIENT SERVICES | Facility: HOSPITAL | Age: 41
LOS: 1 days | Discharge: HOME | End: 2021-09-08

## 2021-09-08 ENCOUNTER — ASOB RESULT (OUTPATIENT)
Age: 41
End: 2021-09-08

## 2021-09-08 VITALS
BODY MASS INDEX: 28.83 KG/M2 | HEIGHT: 59 IN | OXYGEN SATURATION: 100 % | SYSTOLIC BLOOD PRESSURE: 114 MMHG | TEMPERATURE: 98.4 F | DIASTOLIC BLOOD PRESSURE: 69 MMHG | WEIGHT: 143 LBS | HEART RATE: 83 BPM

## 2021-09-08 VITALS
DIASTOLIC BLOOD PRESSURE: 70 MMHG | SYSTOLIC BLOOD PRESSURE: 110 MMHG | HEIGHT: 59 IN | WEIGHT: 144 LBS | BODY MASS INDEX: 29.03 KG/M2

## 2021-09-08 LAB
BILIRUB UR QL STRIP: NORMAL
BILIRUB UR QL STRIP: NORMAL
CLARITY UR: CLEAR
CLARITY UR: CLEAR
COLLECTION METHOD: NORMAL
COLLECTION METHOD: NORMAL
FETAL HEART RATE (BPM): 135
FETAL MOVEMENT: PRESENT
GLUCOSE BLDC GLUCOMTR-MCNC: 92
GLUCOSE BLDC GLUCOMTR-MCNC: 92 MG/DL — SIGNIFICANT CHANGE UP (ref 70–99)
GLUCOSE UR-MCNC: NORMAL
GLUCOSE UR-MCNC: NORMAL
HCG UR QL: 0.2 EU/DL
HCG UR QL: 0.2 EU/DL
HGB UR QL STRIP.AUTO: NORMAL
HGB UR QL STRIP.AUTO: NORMAL
KETONES UR-MCNC: NORMAL
KETONES UR-MCNC: NORMAL
LEUKOCYTE ESTERASE UR QL STRIP: NORMAL
LEUKOCYTE ESTERASE UR QL STRIP: NORMAL
NITRITE UR QL STRIP: NORMAL
NITRITE UR QL STRIP: NORMAL
OB COMMENTS: NORMAL
PH UR STRIP: 6
PH UR STRIP: 6.5
PROT UR STRIP-MCNC: NORMAL
PROT UR STRIP-MCNC: NORMAL
SP GR UR STRIP: 1
SP GR UR STRIP: 1.01
URINE ALBUMIN/PROTEIN: NORMAL
URINE GLUCOSE: NORMAL
URINE KETONES: NORMAL
WEEKS GESTATION: NORMAL

## 2021-09-08 PROCEDURE — 76819 FETAL BIOPHYS PROFIL W/O NST: CPT | Mod: 26

## 2021-09-08 PROCEDURE — 99214 OFFICE O/P EST MOD 30 MIN: CPT | Mod: 25

## 2021-09-08 PROCEDURE — 76816 OB US FOLLOW-UP PER FETUS: CPT | Mod: 26

## 2021-09-08 PROCEDURE — 99213 OFFICE O/P EST LOW 20 MIN: CPT

## 2021-09-08 RX ORDER — ASPIRIN 81 MG/1
81 TABLET, CHEWABLE ORAL DAILY
Qty: 60 | Refills: 4 | Status: ACTIVE | COMMUNITY
Start: 2021-09-08 | End: 1900-01-01

## 2021-09-08 RX ORDER — ISOPROPYL ALCOHOL 70 ML/100ML
SWAB TOPICAL
Qty: 100 | Refills: 3 | Status: ACTIVE | COMMUNITY
Start: 2021-09-08 | End: 1900-01-01

## 2021-09-08 RX ORDER — LANCETS 33 GAUGE
EACH MISCELLANEOUS
Qty: 100 | Refills: 3 | Status: ACTIVE | COMMUNITY
Start: 2021-09-08 | End: 1900-01-01

## 2021-09-08 RX ORDER — BLOOD SUGAR DIAGNOSTIC
STRIP MISCELLANEOUS 4 TIMES DAILY
Qty: 100 | Refills: 3 | Status: ACTIVE | COMMUNITY
Start: 2021-09-08 | End: 1900-01-01

## 2021-09-08 NOTE — END OF VISIT
[] : Resident [Time Spent: ___ minutes] : I have spent [unfilled] minutes of time on the encounter. [FreeTextEntry3] : Blood sugar well controlled. EFW 1880 20 % ile BPP 8/8.\par Patient doing well.

## 2021-09-08 NOTE — DISCUSSION/SUMMARY
[FreeTextEntry1] :  number \par \par 39yo  at 32w5d GA dated by LMP 21, EDC 10/28/21 consistent with 2nd trimester sonogram, here for follow-up of abnormal GCT. We are treating her as gestational diabetic., co-managed with Dr. Wheeler.\par \par #type II diabetes mellitus vs gestational diabetes in pregnancy \par -FS today 92 (fasting)\par -FS log reviewed: \par *Fastin-88\par *2hr PP: \par - s/p diabetic teaching and nutrition counseling \par - FS goal fasting <95, 2 hour PP<120. Encouraged to document FS. \par - Discussed increased risk of type II DM later in life and that we will screen for that after pregnancy.\par - s/p podiatry consult \par \par # AMA \par -NIPTs low risk \par -AFP low risk \par - Discussed increased risk of developing GDM, preeclampsia, placental abruption, which could result in  birth or need for  delivery. \par \par #pregnancy \par -prenatal vitamins \par -GBS bacteruria\par -continue taking ASA 81mg daily \par -recommend monthly EFW, growth sonogram today\par -discussed compliance with prenatal care, PO maternal hydration, and maternal exercise \par -please call your physician if you experience any of the following: decreased fetal movement, vaginal bleeding, leakage of fluid, or contractions\par \par \par FS currently well-controlled, can be followed by primary OB.  Return to [facility] prn or if worsening FS noted.  Recommend weekly BPP and monthly growth starting @37wks. \par \par Dr. Terry aware

## 2021-09-08 NOTE — HISTORY OF PRESENT ILLNESS
[FreeTextEntry1] : 41yo  at 32w5d GA, dated by LMP 21, consistent with 2nd trimester sonogram, here for follow-up of GDMA versus pre-gestational diabetes, co-managed with Dr. Wheeler.\par \par Reports good fetal movement. Denies vaginal bleeding, leakage of fluid, or contractions. No other complaints.\par \par FS log reviewed: \par *Fastin-88\par *2hr PP: \par \par \par Historical Values: \par Early GCT of 209

## 2021-09-09 LAB
BASOPHILS # BLD AUTO: 0.03 K/UL
BASOPHILS NFR BLD AUTO: 0.5 %
EOSINOPHIL # BLD AUTO: 0.08 K/UL
EOSINOPHIL NFR BLD AUTO: 1.3 %
HCT VFR BLD CALC: 37.7 %
HGB BLD-MCNC: 12.7 G/DL
IMM GRANULOCYTES NFR BLD AUTO: 0.2 %
LYMPHOCYTES # BLD AUTO: 1.29 K/UL
LYMPHOCYTES NFR BLD AUTO: 21.4 %
MAN DIFF?: NORMAL
MCHC RBC-ENTMCNC: 30.9 PG
MCHC RBC-ENTMCNC: 33.7 G/DL
MCV RBC AUTO: 91.7 FL
MONOCYTES # BLD AUTO: 0.56 K/UL
MONOCYTES NFR BLD AUTO: 9.3 %
NEUTROPHILS # BLD AUTO: 4.06 K/UL
NEUTROPHILS NFR BLD AUTO: 67.3 %
PLATELET # BLD AUTO: 161 K/UL
RBC # BLD: 4.11 M/UL
RBC # FLD: 14.4 %
WBC # FLD AUTO: 6.03 K/UL

## 2021-09-10 LAB
ABO + RH PNL BLD: NORMAL
BLD GP AB SCN SERPL QL: NORMAL
HIV1+2 AB SPEC QL IA.RAPID: NONREACTIVE
T PALLIDUM AB SER QL IA: NEGATIVE

## 2021-09-14 ENCOUNTER — ASOB RESULT (OUTPATIENT)
Age: 41
End: 2021-09-14

## 2021-09-14 ENCOUNTER — OUTPATIENT (OUTPATIENT)
Dept: OUTPATIENT SERVICES | Facility: HOSPITAL | Age: 41
LOS: 1 days | Discharge: HOME | End: 2021-09-14

## 2021-09-14 ENCOUNTER — APPOINTMENT (OUTPATIENT)
Dept: ANTEPARTUM | Facility: CLINIC | Age: 41
End: 2021-09-14
Payer: MEDICAID

## 2021-09-14 PROCEDURE — 76819 FETAL BIOPHYS PROFIL W/O NST: CPT | Mod: 26

## 2021-09-15 DIAGNOSIS — O09.523 SUPERVISION OF ELDERLY MULTIGRAVIDA, THIRD TRIMESTER: ICD-10-CM

## 2021-09-15 DIAGNOSIS — O26.849 UTERINE SIZE-DATE DISCREPANCY, UNSPECIFIED TRIMESTER: ICD-10-CM

## 2021-09-15 DIAGNOSIS — Z3A.33 33 WEEKS GESTATION OF PREGNANCY: ICD-10-CM

## 2021-09-15 DIAGNOSIS — O09.521 SUPERVISION OF ELDERLY MULTIGRAVIDA, FIRST TRIMESTER: ICD-10-CM

## 2021-09-15 DIAGNOSIS — O24.410 GESTATIONAL DIABETES MELLITUS IN PREGNANCY, DIET CONTROLLED: ICD-10-CM

## 2021-09-15 DIAGNOSIS — O24.919 UNSPECIFIED DIABETES MELLITUS IN PREGNANCY, UNSPECIFIED TRIMESTER: ICD-10-CM

## 2021-09-16 ENCOUNTER — EMERGENCY (EMERGENCY)
Facility: HOSPITAL | Age: 41
LOS: 0 days | Discharge: HOME | End: 2021-09-16
Attending: EMERGENCY MEDICINE | Admitting: EMERGENCY MEDICINE
Payer: MEDICAID

## 2021-09-16 VITALS
HEART RATE: 110 BPM | OXYGEN SATURATION: 97 % | TEMPERATURE: 96 F | SYSTOLIC BLOOD PRESSURE: 146 MMHG | RESPIRATION RATE: 16 BRPM | DIASTOLIC BLOOD PRESSURE: 86 MMHG | WEIGHT: 143.96 LBS

## 2021-09-16 VITALS
OXYGEN SATURATION: 98 % | TEMPERATURE: 97 F | DIASTOLIC BLOOD PRESSURE: 75 MMHG | HEART RATE: 78 BPM | SYSTOLIC BLOOD PRESSURE: 133 MMHG | RESPIRATION RATE: 18 BRPM

## 2021-09-16 DIAGNOSIS — K64.5 PERIANAL VENOUS THROMBOSIS: ICD-10-CM

## 2021-09-16 DIAGNOSIS — K64.9 UNSPECIFIED HEMORRHOIDS: ICD-10-CM

## 2021-09-16 PROCEDURE — 99284 EMERGENCY DEPT VISIT MOD MDM: CPT | Mod: 25

## 2021-09-16 PROCEDURE — 46083 INC THROMBOSED HROID XTRNL: CPT

## 2021-09-16 RX ORDER — LIDOCAINE 4 G/100G
1 CREAM TOPICAL
Qty: 50 | Refills: 0
Start: 2021-09-16 | End: 2021-09-18

## 2021-09-16 NOTE — ED PROVIDER NOTE - PATIENT PORTAL LINK FT
You can access the FollowMyHealth Patient Portal offered by [facility] by registering at the following website: [email or web address]. By joining [employer]’s FollowMyHealth portal, you will also be able to view your health information using other applications (apps) compatible with our system.

## 2021-09-16 NOTE — ED PROVIDER NOTE - PHYSICAL EXAMINATION
Vital Signs: I have reviewed the initial vital signs.  Constitutional: well-nourished, appears stated age, no acute distress.  HEENT: Airway patent, moist MM, no erythema/swelling/deformity of oral structures. EOMI, PERRLA.  CV: regular rate, regular rhythm, well-perfused extremities, 2+ b/l DP and radial pulses equal.  Lungs: BCTA, no increased WOB.  ABD: NTND, no guarding or rebound, no pulsatile mass, no hernias, no flank pain. Rectal exam noted for firm hemorrhoid that is mild ttp, no blood noted.   MSK: Neck supple, nontender, nl ROM, no stepoff. Chest nontender. Back nontender in TLS spine or to b/l bony structures. Ext nontender, nl rom, no deformity.   INTEG: Skin warm, dry, no rash.  NEURO: A&Ox3, moving all extremities, normal speech  PSYCH: Calm, cooperative, normal affect and interaction.

## 2021-09-16 NOTE — ED PROVIDER NOTE - NSFOLLOWUPINSTRUCTIONS_ED_ALL_ED_FT
Las hemorroides    LO QUE NECESITA SABER:    Las hemorroides son vasos sanguíneos inflamados dentro del recto (hemorroides internas) o en el ano (hemorroides externas). A veces, alyssa hemorroide puede hacer un prolapso. [place] significa que se extiende fuera del ano.    INSTRUCCIONES SOBRE [facility]ITAL[facility]:    Busque atención médica de inmediato si:  •Usted siente mucho dolor en el recto o alrededor del ano.      •Tiene dolor intenso en el abdomen y está vomitando.      •Tiene sangrado por el ano que le empapa la ropa interior.      Comuníquese con hobbs médico si:  •Usted tiene deposiciones intestinales frecuentes y dolorosas.      •La hemorroide se ve o se siente más hinchada que de costumbre.      •Usted no tiene evacuaciones intestinales por 2 días o más.      •Ve o siente que le sale un tejido del ano.      •Usted tiene preguntas o inquietudes acerca de hobbs condición o cuidado.      Medicamentos:Es posible que usted necesite alguno de los siguientes:   •Medicamentospara ayudar a disminuir el dolor, la hinchazón y la picazón. El medicamento puede venir kee almohadilla, crema o ungüento.      •Los laxantesayudan a tratar o a evitar el estreñimiento.      •Los FROILAN,kee el ibuprofeno, ayudan a disminuir la inflamación, el dolor y la fiebre. Los FROILAN pueden causar sangrado estomacal o problemas renales en ciertas personas. Si usted rebecca un medicamento anticoagulante, siempre pregúntele a hobbs médico si los FROILAN son seguros para usted. Siempre daryn la etiqueta de nicholas medicamento y siga las instrucciones.      •[place] jaz medicamentos kee se le haya indicado.Consulte con hobbs médico si usted garth que hobbs medicamento no le está ayudando o si presenta efectos secundarios. Infórmele si es alérgico a cualquier medicamento. Mantenga alyssa lista actualizada de los medicamentos, las vitaminas y los productos herbales que rebecca. Incluya los siguientes datos de los medicamentos: cantidad, frecuencia y motivo de administración. Traiga con usted la lista o los envases de las píldoras a jaz citas de seguimiento. Lleve la lista de los medicamentos con usted en gerald de alyssa emergencia.      El manejo de jaz síntomas:  •Aplíquese hielo en el ano de 15 a 20 minutos cada hora o según las indicaciones.Use alyssa compresa de hielo o ponga hielo triturado en alyssa bolsa de plástico. Cúbralo con alyssa toalla antes de aplicar sobre el ano. El hielo ayuda a evitar daño al tejido y a disminuir la inflamación y el dolor.      •[place] un baño de asiento.Llene alyssa humberto de baño con 4 a 6 pulgadas de agua cálida. También puede usar un recipiente para baño de asiento que quepa en el inodoro. Siéntese en el baño de asiento estevan 15 minutos. Hágalo 3 veces al día y después de cada evacuación intestinal. El agua cálida va a ayudar a reducir el dolor y la inflamación.      •Mantenga limpia el área del ano.Lávese el área con agua tibia todos los días. El jabón podría causar irritación. Límpiese con toallitas húmedas o papel higiénico húmedo después de tener alyssa deposición intestinal. El papel higiénico seco podría irritar el área.      Evite las hemorroides:  •No se force para tener un movimiento intestinal.No se siente en el inodoro estevan mucho tiempo. Estas acciones pueden aumentar la presión sobre los tejidos del recto y el ano.      •[place] suficiente líquidos.Los líquidos pueden ayudar a prevenir el estreñimiento. Pregunte cuánto líquido debe kristi cada día y cuáles líquidos son los más adecuados para usted.      •Consuma alyssa variedad de alimentos ricos en fibra.Entre los ejemplos, se incluyen frutas, vegetales y granos enteros. Pregunte a hobbs médico cuál es la cantidad de fibra que necesita al día. Es posible que deba kristi un suplemento de fibra.              •Ejercítese según indicaciones.El hacer ejercicio, kee caminar, puede servir para que tenga alyssa evacuación intestinal. Pida a hobbs médico que lo ayude a crear un programa de ejercicio.      •No tenga contacto sexual anal.El contacto sexual anal podría debilitar la piel alrededor del recto y el ano.      •Evite levantar cosas pesadas.Coronado puede causar esfuerzo y aumentar el riesgo de tener otra hemorroide.      Acuda a la consulta de control con hobbs médico según las indicaciones:Anote jaz preguntas para que se acuerde de hacerlas estevan jaz visitas.

## 2021-09-16 NOTE — ED PROVIDER NOTE - CLINICAL SUMMARY MEDICAL DECISION MAKING FREE TEXT BOX
42 y/o F, 8 months pregnant states she has been having hemorrhoids for the last 5 months. Reports that yesterday it became very painful. No bleeding. No CP. No SOB. No abd pain. On exam: Pt in NAD, AAOX3. Head NCAT. CN II-XII intact. Lungs CTABL. CV S1S2 regular. Abdomen gravid, soft NTND, (+) BS. Rectal +thrombosed hemorrhoid, tender to touch. Extremities (-) edema. Motor 5/5 x4, sensation intact. Plan: thrombus excised. Symptoms resolved. Will d/c.

## 2021-09-16 NOTE — ED PROVIDER NOTE - PROGRESS NOTE DETAILS
Attending Note: 40 y/o F x8 months pregnant states she has been having hemorrhoids for the last x5 months. Reports yesterday became very painful. No bleeding. No CP. No SOB. No abd pain. No blood on underwear. On exam: Pt in NAD, AAOX3. Head NCAT. CN II-XII intact. Lungs CTABL. CV S1S2 regular. Abdomen gravid, soft NTND, (+) BS. Rectal +thrombosed hemorrhoid, tender to touch. Extremities (-) edema. Motor 5/5 x4, sensation intact. Plan: thrombus excised. Pt with the dissolution of sx, will d/c. Attending Note: 40 y/o F, 8 months pregnant states she has been having hemorrhoids for the last 5 months. Reports that yesterday it became very painful. No bleeding. No CP. No SOB. No abd pain. On exam: Pt in NAD, AAOX3. Head NCAT. CN II-XII intact. Lungs CTABL. CV S1S2 regular. Abdomen gravid, soft NTND, (+) BS. Rectal +thrombosed hemorrhoid, tender to touch. Extremities (-) edema. Motor 5/5 x4, sensation intact. Plan: thrombus excised. Symptoms resolved. Will d/c.

## 2021-09-16 NOTE — ED PROVIDER NOTE - OBJECTIVE STATEMENT
41F 8 months pregnant presents with hemorrhoid. Patient notes she started having pain yesterday, now cannot sit or walk without discomfort, denies abd pain/rectal bleeding/diarrhea/vomiting/fever/syncope/vaginal bleeding.

## 2021-09-16 NOTE — ED PROCEDURE NOTE - GENERAL PROCEDURE DETAILS
Area cleaned, anesthetised with 4cc lidocaine, elliptical incision made with removal of one small thrombus. Patient noted improvement of symtpoms immediately.

## 2021-09-16 NOTE — ED PROVIDER NOTE - NSFOLLOWUPCLINICS_GEN_ALL_ED_FT
[facility]  Medicine  [address], [place]   Phone: [phone]  Fax:   Follow Up Time: 4-6 Days

## 2021-09-21 ENCOUNTER — APPOINTMENT (OUTPATIENT)
Dept: PODIATRY | Facility: CLINIC | Age: 41
End: 2021-09-21

## 2021-09-21 ENCOUNTER — OUTPATIENT (OUTPATIENT)
Dept: OUTPATIENT SERVICES | Facility: HOSPITAL | Age: 41
LOS: 1 days | Discharge: HOME | End: 2021-09-21

## 2021-09-21 ENCOUNTER — NON-APPOINTMENT (OUTPATIENT)
Age: 41
End: 2021-09-21

## 2021-09-21 ENCOUNTER — APPOINTMENT (OUTPATIENT)
Dept: ANTEPARTUM | Facility: CLINIC | Age: 41
End: 2021-09-21
Payer: MEDICAID

## 2021-09-21 ENCOUNTER — ASOB RESULT (OUTPATIENT)
Age: 41
End: 2021-09-21

## 2021-09-21 PROCEDURE — 76819 FETAL BIOPHYS PROFIL W/O NST: CPT | Mod: 26

## 2021-09-21 RX ORDER — DICLOFENAC SODIUM 3 G/100G
3 GEL TOPICAL TWICE DAILY
Qty: 1 | Refills: 2 | Status: ACTIVE | COMMUNITY
Start: 2021-07-21 | End: 1900-01-01

## 2021-09-21 NOTE — HISTORY OF PRESENT ILLNESS
[FreeTextEntry1] : Pregnant, Third trimester\par B/L LE pain, tingling and numbness. Also affection B/L UE \par Diabetic (gestational vs Type 2)

## 2021-09-21 NOTE — ASSESSMENT
[FreeTextEntry1] : Neuropathy - likely due to diabetes\par Pregnant in thirst trimester\par \par Rx Diclofenac\par Rx HbA1c\par RTO 3 months \par

## 2021-09-21 NOTE — PHYSICAL EXAM
[Ankle Swelling (On Exam)] : present [Ankle Swelling Bilaterally] : bilaterally  [Ankle Swelling On The Right] : mild [2+] : left foot dorsalis pedis 2+ [No Joint Swelling] : no joint swelling [Normal Foot/Ankle] : Both lower extremities were exposed and visualized. Standing exam demonstrates normal foot posture and alignment. Hindfoot exam shows no hindfoot valgus or varus [de-identified] : No pain on palpation  [Skin Color & Pigmentation] : normal skin color and pigmentation [] : no rash [Skin Lesions] : no skin lesions

## 2021-09-22 ENCOUNTER — OUTPATIENT (OUTPATIENT)
Dept: OUTPATIENT SERVICES | Facility: HOSPITAL | Age: 41
LOS: 1 days | Discharge: HOME | End: 2021-09-22

## 2021-09-22 ENCOUNTER — RESULT CHARGE (OUTPATIENT)
Age: 41
End: 2021-09-22

## 2021-09-22 ENCOUNTER — APPOINTMENT (OUTPATIENT)
Dept: OBGYN | Facility: CLINIC | Age: 41
End: 2021-09-22
Payer: MEDICAID

## 2021-09-22 ENCOUNTER — NON-APPOINTMENT (OUTPATIENT)
Age: 41
End: 2021-09-22

## 2021-09-22 VITALS
DIASTOLIC BLOOD PRESSURE: 78 MMHG | BODY MASS INDEX: 28.72 KG/M2 | SYSTOLIC BLOOD PRESSURE: 112 MMHG | HEIGHT: 59 IN | WEIGHT: 142.44 LBS

## 2021-09-22 DIAGNOSIS — O24.919 UNSPECIFIED DIABETES MELLITUS IN PREGNANCY, UNSPECIFIED TRIMESTER: ICD-10-CM

## 2021-09-22 DIAGNOSIS — O09.521 SUPERVISION OF ELDERLY MULTIGRAVIDA, FIRST TRIMESTER: ICD-10-CM

## 2021-09-22 DIAGNOSIS — Z3A.34 34 WEEKS GESTATION OF PREGNANCY: ICD-10-CM

## 2021-09-22 LAB
BILIRUB UR QL STRIP: NORMAL
CLARITY UR: CLEAR
COLLECTION METHOD: NORMAL
GLUCOSE UR-MCNC: NORMAL
HCG UR QL: 0.2 EU/DL
HGB UR QL STRIP.AUTO: NORMAL
KETONES UR-MCNC: NORMAL
LEUKOCYTE ESTERASE UR QL STRIP: NORMAL
NITRITE UR QL STRIP: NORMAL
PH UR STRIP: 6
PROT UR STRIP-MCNC: NORMAL
SP GR UR STRIP: 1.01

## 2021-09-22 PROCEDURE — 99213 OFFICE O/P EST LOW 20 MIN: CPT

## 2021-09-22 RX ORDER — DOCUSATE SODIUM 100 MG/1
100 CAPSULE ORAL TWICE DAILY
Qty: 60 | Refills: 1 | Status: ACTIVE | COMMUNITY
Start: 2021-09-22 | End: 1900-01-01

## 2021-09-28 ENCOUNTER — ASOB RESULT (OUTPATIENT)
Age: 41
End: 2021-09-28

## 2021-09-28 ENCOUNTER — OUTPATIENT (OUTPATIENT)
Dept: OUTPATIENT SERVICES | Facility: HOSPITAL | Age: 41
LOS: 1 days | Discharge: HOME | End: 2021-09-28

## 2021-09-28 ENCOUNTER — APPOINTMENT (OUTPATIENT)
Dept: ANTEPARTUM | Facility: CLINIC | Age: 41
End: 2021-09-28
Payer: MEDICAID

## 2021-09-28 PROCEDURE — 76819 FETAL BIOPHYS PROFIL W/O NST: CPT | Mod: 26

## 2021-09-28 PROCEDURE — 99211 OFF/OP EST MAY X REQ PHY/QHP: CPT

## 2021-09-29 ENCOUNTER — APPOINTMENT (OUTPATIENT)
Dept: SURGERY | Facility: CLINIC | Age: 41
End: 2021-09-29
Payer: MEDICAID

## 2021-09-29 VITALS
DIASTOLIC BLOOD PRESSURE: 66 MMHG | HEIGHT: 59 IN | TEMPERATURE: 98.3 F | BODY MASS INDEX: 29.03 KG/M2 | HEART RATE: 103 BPM | OXYGEN SATURATION: 98 % | SYSTOLIC BLOOD PRESSURE: 118 MMHG | WEIGHT: 144 LBS

## 2021-09-29 DIAGNOSIS — K64.5 PERIANAL VENOUS THROMBOSIS: ICD-10-CM

## 2021-09-29 PROCEDURE — 99203 OFFICE O/P NEW LOW 30 MIN: CPT

## 2021-09-29 NOTE — HISTORY OF PRESENT ILLNESS
[FreeTextEntry1] : Patient is a 41F with PMH of gestation DM, 8 months pregnant who presents for evaluation of hemorrhoids.  She notices pain and swelling for the last few weeks.  It has been improving.  She has daily BM without issue. Patient denies fevers, chills, nausea, vomiting, abdominal pain, constipation, diarrhea, blood in his stool or unexpected weight loss.  Patient denies a family history of colon cancer rectal cancer or inflammatory bowel disease.  Patient has never had a colonoscopy.\par

## 2021-09-29 NOTE — PHYSICAL EXAM
[Abdomen Masses] : No abdominal masses [Abdomen Tenderness] : ~T No ~M abdominal tenderness [No HSM] : no hepatosplenomegaly [None] : no anal fissures seen [Excoriation] : no perianal excoriation [Fistula] : no fistulas [Wart] : no warts [Ulcer ___ cm] : no ulcers [Pilonidal Cyst] : no pilonidal cysts [Tender, Swollen] : tender, swollen [Thrombosed] : that was thrombosed [Skin Tags] : residual hemorrhoidal skin tags were noted [Respiratory Effort] : normal respiratory effort [Normal Rate and Rhythm] : normal rate and rhythm [de-identified] : soft, non tender, gravid uterus [de-identified] : external examination shows a 2cm left sided resolving thrombosed hemorrhoid [de-identified] : AYSHA and anoscopy not performed due to pain [de-identified] : awake, alert and in no acute distress

## 2021-09-29 NOTE — ASSESSMENT
[FreeTextEntry1] : 41F 8 months pregnant with a thrombosed external hemorrhoid\par \par I had a discussion with the patient regarding their thrombosed external hemorrhoid.  I recommended conservative management. I recommended a high fiber diet, a fiber supplement, laxatives as needed.  We discussed that she will improve after delivering her baby.  We will see her back in 3 months.

## 2021-09-30 DIAGNOSIS — O09.529 SUPERVISION OF ELDERLY MULTIGRAVIDA, UNSPECIFIED TRIMESTER: ICD-10-CM

## 2021-09-30 DIAGNOSIS — O24.410 GESTATIONAL DIABETES MELLITUS IN PREGNANCY, DIET CONTROLLED: ICD-10-CM

## 2021-09-30 DIAGNOSIS — Z3A.35 35 WEEKS GESTATION OF PREGNANCY: ICD-10-CM

## 2021-10-05 ENCOUNTER — NON-APPOINTMENT (OUTPATIENT)
Age: 41
End: 2021-10-05

## 2021-10-05 ENCOUNTER — APPOINTMENT (OUTPATIENT)
Dept: ANTEPARTUM | Facility: CLINIC | Age: 41
End: 2021-10-05

## 2021-10-06 ENCOUNTER — NON-APPOINTMENT (OUTPATIENT)
Age: 41
End: 2021-10-06

## 2021-10-06 ENCOUNTER — ASOB RESULT (OUTPATIENT)
Age: 41
End: 2021-10-06

## 2021-10-06 ENCOUNTER — APPOINTMENT (OUTPATIENT)
Dept: ANTEPARTUM | Facility: CLINIC | Age: 41
End: 2021-10-06
Payer: MEDICAID

## 2021-10-06 ENCOUNTER — OUTPATIENT (OUTPATIENT)
Dept: OUTPATIENT SERVICES | Facility: HOSPITAL | Age: 41
LOS: 1 days | Discharge: HOME | End: 2021-10-06

## 2021-10-06 ENCOUNTER — APPOINTMENT (OUTPATIENT)
Dept: OBGYN | Facility: CLINIC | Age: 41
End: 2021-10-06
Payer: MEDICAID

## 2021-10-06 ENCOUNTER — RESULT CHARGE (OUTPATIENT)
Age: 41
End: 2021-10-06

## 2021-10-06 VITALS
OXYGEN SATURATION: 99 % | HEART RATE: 86 BPM | WEIGHT: 147 LBS | DIASTOLIC BLOOD PRESSURE: 81 MMHG | BODY MASS INDEX: 29.69 KG/M2 | SYSTOLIC BLOOD PRESSURE: 126 MMHG

## 2021-10-06 VITALS — WEIGHT: 147 LBS | BODY MASS INDEX: 29.69 KG/M2 | DIASTOLIC BLOOD PRESSURE: 74 MMHG | SYSTOLIC BLOOD PRESSURE: 120 MMHG

## 2021-10-06 DIAGNOSIS — Z3A.36 36 WEEKS GESTATION OF PREGNANCY: ICD-10-CM

## 2021-10-06 DIAGNOSIS — O09.523 SUPERVISION OF ELDERLY MULTIGRAVIDA, THIRD TRIMESTER: ICD-10-CM

## 2021-10-06 DIAGNOSIS — O24.410 GESTATIONAL DIABETES MELLITUS IN PREGNANCY, DIET CONTROLLED: ICD-10-CM

## 2021-10-06 LAB
BILIRUB UR QL STRIP: NORMAL
CLARITY UR: CLEAR
COLLECTION METHOD: NORMAL
GLUCOSE BLDC GLUCOMTR-MCNC: 118 MG/DL — HIGH (ref 70–99)
GLUCOSE UR-MCNC: NORMAL
HCG UR QL: 0.2 EU/DL
HGB UR QL STRIP.AUTO: NORMAL
KETONES UR-MCNC: NORMAL
LEUKOCYTE ESTERASE UR QL STRIP: NORMAL
NITRITE UR QL STRIP: NORMAL
PH UR STRIP: 6
PROT UR STRIP-MCNC: NORMAL
SP GR UR STRIP: 1.02

## 2021-10-06 PROCEDURE — 76819 FETAL BIOPHYS PROFIL W/O NST: CPT | Mod: 26

## 2021-10-06 PROCEDURE — 76816 OB US FOLLOW-UP PER FETUS: CPT | Mod: 26

## 2021-10-06 PROCEDURE — 99213 OFFICE O/P EST LOW 20 MIN: CPT

## 2021-10-06 NOTE — HISTORY OF PRESENT ILLNESS
[FreeTextEntry1] : 39yo  at 36w5d GA, dated by LMP 21, consistent with 2nd trimester sonogram, here for follow-up of GDMA versus pre-gestational diabetes, co-managed with Dr. Wheeler.\par \par Reports good fetal movement. Denies vaginal bleeding, leakage of fluid, or contractions. No other complaints.\par \par FS log reviewed:  \par *fastin-92 (isolated 177)\par *2hr PP: \par \par Historical Values: \par Early GCT of 209

## 2021-10-06 NOTE — DISCUSSION/SUMMARY
[FreeTextEntry1] :  Valerie at bedside.\par \par 39yo  at 36w5d GA dated by LMP 21, EDC 10/28/21 consistent with 2nd trimester sonogram, here for follow-up of abnormal GCT. We are treating her as gestational diabetic, co-managed with Dr. Wheeler.\par \par #type II diabetes mellitus vs gestational diabetes in pregnancy \par - FS today 118 (last ate 1.5hr ago, strawberries, banana and oatmeal)\par -FS log reviewed: \par *fastin-92 (isolated 177)\par *2hr PP: \par - s/p diabetic teaching and nutrition counseling \par - FS goal fasting <95, 2 hour PP<120. Encouraged to document FS. \par - previously discussed increased risk of type II DM later in life and that we will screen for that after pregnancy.\par - s/p podiatry consult \par - last EFW 10/06/2021 2732 grams (27th percentile).\par # AMA \par - NIPTs low risk \par - AFP low risk \par - previously discussed increased risk of developing GDM, preeclampsia, placental abruption, which could result in  birth or need for  delivery. \par \par #pregnancy \par - continue prenatal vitamins \par - GBS bacteruria, will need treatment intrapartum\par - continue taking ASA 81mg daily \par - recommend monthly EFW, last EFW \par - discussed compliance with prenatal care, PO maternal hydration, and maternal exercise \par - please call your physician if you experience any of the following: decreased fetal movement, vaginal bleeding, leakage of fluid, or contractions\par \par \par FS currently well-controlled. Return to [facility] in 2weeks with FS logs. Recommend weekly BPP and monthly growth starting @37wks. Recommend delivery at 39 0/6 to 40 0/6 weeks if fingersticks well-controlled, would recommend delivery earlier if FS elevated.\par \par Dr. Pham aware \par

## 2021-10-06 NOTE — END OF VISIT
[FreeTextEntry3] : [facility] Staff\par \par The patient left before i had a chance to see her myself.\par \par GDM A1, Fingersticks moderately well controlled on diet.  Will not start medication at this time.\par \par Fetal movement and labor precautions discussed by Dr. Cisse.\par Weekly BPPs.\par Prenatal care is with Dr. Wheeler, next visit is today.\par RTC 2 weeks.\par \par Jovan Pham MD\par \par \par

## 2021-10-07 LAB
C TRACH RRNA SPEC QL NAA+PROBE: NOT DETECTED
N GONORRHOEA RRNA SPEC QL NAA+PROBE: NOT DETECTED
SOURCE AMPLIFICATION: NORMAL

## 2021-10-08 LAB
ESTIMATED AVERAGE GLUCOSE: 105 MG/DL
HBA1C MFR BLD HPLC: 5.3 %

## 2021-10-12 ENCOUNTER — APPOINTMENT (OUTPATIENT)
Dept: ANTEPARTUM | Facility: CLINIC | Age: 41
End: 2021-10-12
Payer: MEDICAID

## 2021-10-12 ENCOUNTER — OUTPATIENT (OUTPATIENT)
Dept: OUTPATIENT SERVICES | Facility: HOSPITAL | Age: 41
LOS: 1 days | Discharge: HOME | End: 2021-10-12

## 2021-10-12 ENCOUNTER — ASOB RESULT (OUTPATIENT)
Age: 41
End: 2021-10-12

## 2021-10-12 PROCEDURE — 76819 FETAL BIOPHYS PROFIL W/O NST: CPT | Mod: 26

## 2021-10-13 ENCOUNTER — OUTPATIENT (OUTPATIENT)
Dept: OUTPATIENT SERVICES | Facility: HOSPITAL | Age: 41
LOS: 1 days | Discharge: HOME | End: 2021-10-13

## 2021-10-13 ENCOUNTER — APPOINTMENT (OUTPATIENT)
Dept: OBGYN | Facility: CLINIC | Age: 41
End: 2021-10-13
Payer: MEDICAID

## 2021-10-13 VITALS — DIASTOLIC BLOOD PRESSURE: 70 MMHG | BODY MASS INDEX: 29.89 KG/M2 | WEIGHT: 148 LBS | SYSTOLIC BLOOD PRESSURE: 112 MMHG

## 2021-10-13 LAB
BILIRUB UR QL STRIP: NORMAL
CLARITY UR: CLEAR
COLLECTION METHOD: NORMAL
GLUCOSE UR-MCNC: NORMAL
HCG UR QL: 0.2 EU/DL
HGB UR QL STRIP.AUTO: NORMAL
KETONES UR-MCNC: NORMAL
LEUKOCYTE ESTERASE UR QL STRIP: NORMAL
NITRITE UR QL STRIP: NORMAL
PH UR STRIP: 7
PROT UR STRIP-MCNC: NORMAL
SP GR UR STRIP: 1.01

## 2021-10-13 PROCEDURE — 99213 OFFICE O/P EST LOW 20 MIN: CPT

## 2021-10-19 ENCOUNTER — OUTPATIENT (OUTPATIENT)
Dept: OUTPATIENT SERVICES | Facility: HOSPITAL | Age: 41
LOS: 1 days | Discharge: HOME | End: 2021-10-19

## 2021-10-19 ENCOUNTER — ASOB RESULT (OUTPATIENT)
Age: 41
End: 2021-10-19

## 2021-10-19 ENCOUNTER — APPOINTMENT (OUTPATIENT)
Dept: ANTEPARTUM | Facility: CLINIC | Age: 41
End: 2021-10-19
Payer: MEDICAID

## 2021-10-19 PROCEDURE — 76819 FETAL BIOPHYS PROFIL W/O NST: CPT | Mod: 26

## 2021-10-20 ENCOUNTER — OUTPATIENT (OUTPATIENT)
Dept: OUTPATIENT SERVICES | Facility: HOSPITAL | Age: 41
LOS: 1 days | Discharge: HOME | End: 2021-10-20

## 2021-10-20 ENCOUNTER — APPOINTMENT (OUTPATIENT)
Dept: OBGYN | Facility: CLINIC | Age: 41
End: 2021-10-20
Payer: MEDICAID

## 2021-10-20 ENCOUNTER — NON-APPOINTMENT (OUTPATIENT)
Age: 41
End: 2021-10-20

## 2021-10-20 ENCOUNTER — RESULT CHARGE (OUTPATIENT)
Age: 41
End: 2021-10-20

## 2021-10-20 VITALS
SYSTOLIC BLOOD PRESSURE: 110 MMHG | DIASTOLIC BLOOD PRESSURE: 80 MMHG | BODY MASS INDEX: 29.84 KG/M2 | WEIGHT: 148 LBS | HEIGHT: 59 IN

## 2021-10-20 LAB
BILIRUB UR QL STRIP: NORMAL
CLARITY UR: CLEAR
COLLECTION METHOD: NORMAL
GLUCOSE UR-MCNC: NORMAL
HCG UR QL: 0.2 EU/DL
HGB UR QL STRIP.AUTO: NORMAL
KETONES UR-MCNC: NORMAL
LEUKOCYTE ESTERASE UR QL STRIP: NORMAL
NITRITE UR QL STRIP: NORMAL
PH UR STRIP: 6.5
PROT UR STRIP-MCNC: NORMAL
SP GR UR STRIP: 1.02

## 2021-10-20 PROCEDURE — 99213 OFFICE O/P EST LOW 20 MIN: CPT

## 2021-10-23 ENCOUNTER — LABORATORY RESULT (OUTPATIENT)
Age: 41
End: 2021-10-23

## 2021-10-23 ENCOUNTER — OUTPATIENT (OUTPATIENT)
Dept: OUTPATIENT SERVICES | Facility: HOSPITAL | Age: 41
LOS: 1 days | Discharge: HOME | End: 2021-10-23

## 2021-10-23 DIAGNOSIS — Z11.59 ENCOUNTER FOR SCREENING FOR OTHER VIRAL DISEASES: ICD-10-CM

## 2021-10-26 ENCOUNTER — APPOINTMENT (OUTPATIENT)
Dept: ANTEPARTUM | Facility: CLINIC | Age: 41
End: 2021-10-26

## 2021-10-26 ENCOUNTER — INPATIENT (INPATIENT)
Facility: HOSPITAL | Age: 41
LOS: 0 days | Discharge: HOME | End: 2021-10-27
Attending: OBSTETRICS & GYNECOLOGY | Admitting: OBSTETRICS & GYNECOLOGY
Payer: MEDICAID

## 2021-10-26 VITALS
WEIGHT: 147.93 LBS | DIASTOLIC BLOOD PRESSURE: 107 MMHG | SYSTOLIC BLOOD PRESSURE: 176 MMHG | HEIGHT: 58 IN | RESPIRATION RATE: 20 BRPM | HEART RATE: 111 BPM | TEMPERATURE: 99 F

## 2021-10-26 LAB
ALBUMIN SERPL ELPH-MCNC: 3.5 G/DL — SIGNIFICANT CHANGE UP (ref 3.5–5.2)
ALP SERPL-CCNC: 232 U/L — HIGH (ref 30–115)
ALT FLD-CCNC: 15 U/L — SIGNIFICANT CHANGE UP (ref 0–41)
AMPHET UR-MCNC: NEGATIVE — SIGNIFICANT CHANGE UP
ANION GAP SERPL CALC-SCNC: 14 MMOL/L — SIGNIFICANT CHANGE UP (ref 7–14)
APPEARANCE UR: CLEAR — SIGNIFICANT CHANGE UP
AST SERPL-CCNC: 20 U/L — SIGNIFICANT CHANGE UP (ref 0–41)
BARBITURATES UR SCN-MCNC: NEGATIVE — SIGNIFICANT CHANGE UP
BASOPHILS # BLD AUTO: 0.02 K/UL — SIGNIFICANT CHANGE UP (ref 0–0.2)
BASOPHILS NFR BLD AUTO: 0.3 % — SIGNIFICANT CHANGE UP (ref 0–1)
BENZODIAZ UR-MCNC: NEGATIVE — SIGNIFICANT CHANGE UP
BILIRUB SERPL-MCNC: 0.2 MG/DL — SIGNIFICANT CHANGE UP (ref 0.2–1.2)
BILIRUB UR-MCNC: NEGATIVE — SIGNIFICANT CHANGE UP
BLD GP AB SCN SERPL QL: SIGNIFICANT CHANGE UP
BUN SERPL-MCNC: 7 MG/DL — LOW (ref 10–20)
BUPRENORPHINE SCREEN, URINE RESULT: NEGATIVE — SIGNIFICANT CHANGE UP
CALCIUM SERPL-MCNC: 8.8 MG/DL — SIGNIFICANT CHANGE UP (ref 8.5–10.1)
CHLORIDE SERPL-SCNC: 106 MMOL/L — SIGNIFICANT CHANGE UP (ref 98–110)
CO2 SERPL-SCNC: 16 MMOL/L — LOW (ref 17–32)
COCAINE METAB.OTHER UR-MCNC: NEGATIVE — SIGNIFICANT CHANGE UP
COLOR SPEC: COLORLESS — SIGNIFICANT CHANGE UP
CREAT ?TM UR-MCNC: 12 MG/DL — SIGNIFICANT CHANGE UP
CREAT SERPL-MCNC: 0.5 MG/DL — LOW (ref 0.7–1.5)
DIFF PNL FLD: NEGATIVE — SIGNIFICANT CHANGE UP
EOSINOPHIL # BLD AUTO: 0.07 K/UL — SIGNIFICANT CHANGE UP (ref 0–0.7)
EOSINOPHIL NFR BLD AUTO: 1.1 % — SIGNIFICANT CHANGE UP (ref 0–8)
FENTANYL UR QL: NEGATIVE — SIGNIFICANT CHANGE UP
GLUCOSE BLDC GLUCOMTR-MCNC: 73 MG/DL — SIGNIFICANT CHANGE UP (ref 70–99)
GLUCOSE SERPL-MCNC: 122 MG/DL — HIGH (ref 70–99)
GLUCOSE UR QL: NEGATIVE — SIGNIFICANT CHANGE UP
HCT VFR BLD CALC: 38.7 % — SIGNIFICANT CHANGE UP (ref 37–47)
HGB BLD-MCNC: 13.3 G/DL — SIGNIFICANT CHANGE UP (ref 12–16)
IMM GRANULOCYTES NFR BLD AUTO: 0.3 % — SIGNIFICANT CHANGE UP (ref 0.1–0.3)
KETONES UR-MCNC: NEGATIVE — SIGNIFICANT CHANGE UP
L&D DRUG SCREEN, URINE: SIGNIFICANT CHANGE UP
LDH SERPL L TO P-CCNC: 433 — HIGH (ref 50–242)
LEUKOCYTE ESTERASE UR-ACNC: NEGATIVE — SIGNIFICANT CHANGE UP
LYMPHOCYTES # BLD AUTO: 1.75 K/UL — SIGNIFICANT CHANGE UP (ref 1.2–3.4)
LYMPHOCYTES # BLD AUTO: 26.5 % — SIGNIFICANT CHANGE UP (ref 20.5–51.1)
MCHC RBC-ENTMCNC: 31.4 PG — HIGH (ref 27–31)
MCHC RBC-ENTMCNC: 34.4 G/DL — SIGNIFICANT CHANGE UP (ref 32–37)
MCV RBC AUTO: 91.3 FL — SIGNIFICANT CHANGE UP (ref 81–99)
METHADONE UR-MCNC: NEGATIVE — SIGNIFICANT CHANGE UP
MONOCYTES # BLD AUTO: 0.62 K/UL — HIGH (ref 0.1–0.6)
MONOCYTES NFR BLD AUTO: 9.4 % — HIGH (ref 1.7–9.3)
NEUTROPHILS # BLD AUTO: 4.13 K/UL — SIGNIFICANT CHANGE UP (ref 1.4–6.5)
NEUTROPHILS NFR BLD AUTO: 62.4 % — SIGNIFICANT CHANGE UP (ref 42.2–75.2)
NITRITE UR-MCNC: NEGATIVE — SIGNIFICANT CHANGE UP
NRBC # BLD: 0 /100 WBCS — SIGNIFICANT CHANGE UP (ref 0–0)
OPIATES UR-MCNC: NEGATIVE — SIGNIFICANT CHANGE UP
OXYCODONE UR-MCNC: NEGATIVE — SIGNIFICANT CHANGE UP
PCP UR-MCNC: NEGATIVE — SIGNIFICANT CHANGE UP
PH UR: 7 — SIGNIFICANT CHANGE UP (ref 5–8)
PLATELET # BLD AUTO: 131 K/UL — SIGNIFICANT CHANGE UP (ref 130–400)
POTASSIUM SERPL-MCNC: 4.1 MMOL/L — SIGNIFICANT CHANGE UP (ref 3.5–5)
POTASSIUM SERPL-SCNC: 4.1 MMOL/L — SIGNIFICANT CHANGE UP (ref 3.5–5)
PRENATAL SYPHILIS TEST: SIGNIFICANT CHANGE UP
PROPOXYPHENE QUALITATIVE URINE RESULT: NEGATIVE — SIGNIFICANT CHANGE UP
PROT ?TM UR-MCNC: 5 MG/DLG/24H — SIGNIFICANT CHANGE UP
PROT SERPL-MCNC: 6.7 G/DL — SIGNIFICANT CHANGE UP (ref 6–8)
PROT UR-MCNC: NEGATIVE — SIGNIFICANT CHANGE UP
PROT/CREAT UR-RTO: 0.4 RATIO — HIGH (ref 0–0.2)
RBC # BLD: 4.24 M/UL — SIGNIFICANT CHANGE UP (ref 4.2–5.4)
RBC # FLD: 13.6 % — SIGNIFICANT CHANGE UP (ref 11.5–14.5)
SODIUM SERPL-SCNC: 136 MMOL/L — SIGNIFICANT CHANGE UP (ref 135–146)
SP GR SPEC: 1 — LOW (ref 1.01–1.03)
URATE SERPL-MCNC: 3.6 MG/DL — SIGNIFICANT CHANGE UP (ref 2.5–7)
UROBILINOGEN FLD QL: SIGNIFICANT CHANGE UP
WBC # BLD: 6.61 K/UL — SIGNIFICANT CHANGE UP (ref 4.8–10.8)
WBC # FLD AUTO: 6.61 K/UL — SIGNIFICANT CHANGE UP (ref 4.8–10.8)

## 2021-10-26 PROCEDURE — 59409 OBSTETRICAL CARE: CPT | Mod: U9

## 2021-10-26 RX ORDER — IBUPROFEN 200 MG
600 TABLET ORAL EVERY 6 HOURS
Refills: 0 | Status: DISCONTINUED | OUTPATIENT
Start: 2021-10-26 | End: 2021-10-27

## 2021-10-26 RX ORDER — BENZOCAINE 10 %
1 GEL (GRAM) MUCOUS MEMBRANE EVERY 6 HOURS
Refills: 0 | Status: DISCONTINUED | OUTPATIENT
Start: 2021-10-26 | End: 2021-10-27

## 2021-10-26 RX ORDER — OXYCODONE HYDROCHLORIDE 5 MG/1
5 TABLET ORAL
Refills: 0 | Status: DISCONTINUED | OUTPATIENT
Start: 2021-10-26 | End: 2021-10-27

## 2021-10-26 RX ORDER — IBUPROFEN 200 MG
600 TABLET ORAL EVERY 6 HOURS
Refills: 0 | Status: COMPLETED | OUTPATIENT
Start: 2021-10-26 | End: 2022-09-24

## 2021-10-26 RX ORDER — SIMETHICONE 80 MG/1
80 TABLET, CHEWABLE ORAL EVERY 4 HOURS
Refills: 0 | Status: DISCONTINUED | OUTPATIENT
Start: 2021-10-26 | End: 2021-10-27

## 2021-10-26 RX ORDER — OXYTOCIN 10 UNIT/ML
333.33 VIAL (ML) INJECTION
Qty: 20 | Refills: 0 | Status: DISCONTINUED | OUTPATIENT
Start: 2021-10-26 | End: 2021-10-27

## 2021-10-26 RX ORDER — LANOLIN
1 OINTMENT (GRAM) TOPICAL EVERY 6 HOURS
Refills: 0 | Status: DISCONTINUED | OUTPATIENT
Start: 2021-10-26 | End: 2021-10-27

## 2021-10-26 RX ORDER — OXYTOCIN 10 UNIT/ML
2 VIAL (ML) INJECTION
Qty: 30 | Refills: 0 | Status: DISCONTINUED | OUTPATIENT
Start: 2021-10-26 | End: 2021-10-26

## 2021-10-26 RX ORDER — MAGNESIUM HYDROXIDE 400 MG/1
30 TABLET, CHEWABLE ORAL
Refills: 0 | Status: DISCONTINUED | OUTPATIENT
Start: 2021-10-26 | End: 2021-10-27

## 2021-10-26 RX ORDER — AMPICILLIN TRIHYDRATE 250 MG
2 CAPSULE ORAL ONCE
Refills: 0 | Status: COMPLETED | OUTPATIENT
Start: 2021-10-26 | End: 2021-10-26

## 2021-10-26 RX ORDER — OXYCODONE HYDROCHLORIDE 5 MG/1
5 TABLET ORAL ONCE
Refills: 0 | Status: DISCONTINUED | OUTPATIENT
Start: 2021-10-26 | End: 2021-10-27

## 2021-10-26 RX ORDER — ACETAMINOPHEN 500 MG
975 TABLET ORAL
Refills: 0 | Status: DISCONTINUED | OUTPATIENT
Start: 2021-10-26 | End: 2021-10-27

## 2021-10-26 RX ORDER — TETANUS TOXOID, REDUCED DIPHTHERIA TOXOID AND ACELLULAR PERTUSSIS VACCINE, ADSORBED 5; 2.5; 8; 8; 2.5 [IU]/.5ML; [IU]/.5ML; UG/.5ML; UG/.5ML; UG/.5ML
0.5 SUSPENSION INTRAMUSCULAR ONCE
Refills: 0 | Status: DISCONTINUED | OUTPATIENT
Start: 2021-10-26 | End: 2021-10-27

## 2021-10-26 RX ORDER — SODIUM CHLORIDE 9 MG/ML
3 INJECTION INTRAMUSCULAR; INTRAVENOUS; SUBCUTANEOUS EVERY 8 HOURS
Refills: 0 | Status: DISCONTINUED | OUTPATIENT
Start: 2021-10-26 | End: 2021-10-27

## 2021-10-26 RX ORDER — AER TRAVELER 0.5 G/1
1 SOLUTION RECTAL; TOPICAL EVERY 4 HOURS
Refills: 0 | Status: DISCONTINUED | OUTPATIENT
Start: 2021-10-26 | End: 2021-10-27

## 2021-10-26 RX ORDER — SODIUM CHLORIDE 9 MG/ML
1000 INJECTION, SOLUTION INTRAVENOUS
Refills: 0 | Status: DISCONTINUED | OUTPATIENT
Start: 2021-10-26 | End: 2021-10-26

## 2021-10-26 RX ORDER — DIBUCAINE 1 %
1 OINTMENT (GRAM) RECTAL EVERY 6 HOURS
Refills: 0 | Status: DISCONTINUED | OUTPATIENT
Start: 2021-10-26 | End: 2021-10-27

## 2021-10-26 RX ORDER — KETOROLAC TROMETHAMINE 30 MG/ML
30 SYRINGE (ML) INJECTION ONCE
Refills: 0 | Status: DISCONTINUED | OUTPATIENT
Start: 2021-10-26 | End: 2021-10-27

## 2021-10-26 RX ORDER — HYDROCORTISONE 1 %
1 OINTMENT (GRAM) TOPICAL EVERY 6 HOURS
Refills: 0 | Status: DISCONTINUED | OUTPATIENT
Start: 2021-10-26 | End: 2021-10-27

## 2021-10-26 RX ORDER — AMPICILLIN TRIHYDRATE 250 MG
1 CAPSULE ORAL EVERY 4 HOURS
Refills: 0 | Status: DISCONTINUED | OUTPATIENT
Start: 2021-10-26 | End: 2021-10-26

## 2021-10-26 RX ORDER — DIPHENHYDRAMINE HCL 50 MG
25 CAPSULE ORAL EVERY 6 HOURS
Refills: 0 | Status: DISCONTINUED | OUTPATIENT
Start: 2021-10-26 | End: 2021-10-27

## 2021-10-26 RX ORDER — PRAMOXINE HYDROCHLORIDE 150 MG/15G
1 AEROSOL, FOAM RECTAL EVERY 4 HOURS
Refills: 0 | Status: DISCONTINUED | OUTPATIENT
Start: 2021-10-26 | End: 2021-10-27

## 2021-10-26 RX ADMIN — Medication 108 GRAM(S): at 12:13

## 2021-10-26 RX ADMIN — Medication 975 MILLIGRAM(S): at 21:21

## 2021-10-26 RX ADMIN — Medication 216 GRAM(S): at 08:16

## 2021-10-26 RX ADMIN — Medication 975 MILLIGRAM(S): at 21:51

## 2021-10-26 RX ADMIN — SODIUM CHLORIDE 3 MILLILITER(S): 9 INJECTION INTRAMUSCULAR; INTRAVENOUS; SUBCUTANEOUS at 21:22

## 2021-10-26 RX ADMIN — Medication 2 MILLIUNIT(S)/MIN: at 09:28

## 2021-10-26 NOTE — OB PROVIDER H&P - ASSESSMENT
41y.o.  @ 39.4wks IOL at term, Type 2 DM, diet controlled, AMA, GBS+, Elevated BP r/o gHTN vs preeclampsia  Admit to L&D  IVF, labs  Continuous efm and toco  Pitocin IOL  Pain management PRN  Anticipate   Dr. Logan/ Dr. Wheeler aware

## 2021-10-26 NOTE — OB PROVIDER DELIVERY SUMMARY - NSSELHIDDEN_OBGYN_ALL_OB_FT
[NS_DeliveryAttending1_OBGYN_ALL_OB_FT:[id number]=],[NS_DeliveryAttending2_OBGYN_ALL_OB_FT:[id number]=],[NS_DeliveryAssist1_OBGYN_ALL_OB_FT:[id number]=]

## 2021-10-26 NOTE — OB PROVIDER H&P - ATTENDING COMMENTS
Pt here today for induction due to probable pregestational dm. NST reactive. No contraindication to vaginal delivery. Anticipate

## 2021-10-26 NOTE — OB PROVIDER H&P - NSHPLABSRESULTS_GEN_ALL_CORE
Early - Likely Type 2 DM  Fasting 80-90, 2 hr PP <110    AFP WNL    Sono @ 21wks S=D, nl anatomy  Sono @ 26.5wks S=D, EFW: 969g, 2lb2oz (37%), ant placenta  Sono @ 32.5wks S=D, EFW: 1880g, 4lb2oz (20%), vtx, ant placenta  Sono @ 36.5wks S=D, EFW: 2732g, 6lb (27%), BPP 8/8  Sono @ 37.5wks S=D, vtx, ant placenta, BPP 8/8  Sono @ 38.4wks S=D, vtx, ant placenta, BPP 8/8

## 2021-10-26 NOTE — OB RN PATIENT PROFILE - ALERT: PERTINENT HISTORY
20 Week Level II Sonogram/BioPhysical Profile(s)/Follow up Sonogram for Growth/Ultra Screen at 12 Weeks

## 2021-10-26 NOTE — OB RN DELIVERY SUMMARY - NSSELHIDDEN_OBGYN_ALL_OB_FT
[NS_DeliveryAttending1_OBGYN_ALL_OB_FT:[id number]=],[NS_DeliveryAttending2_OBGYN_ALL_OB_FT:[id number]],[NS_DeliveryAssist1_OBGYN_ALL_OB_FT:[id number]] [NS_DeliveryAttending1_OBGYN_ALL_OB_FT:[id number]=],[NS_DeliveryAttending2_OBGYN_ALL_OB_FT:[id number]=],[NS_DeliveryAssist1_OBGYN_ALL_OB_FT:[id number]=],[NS_DeliveryRN_OBGYN_ALL_OB_FT:[id number]]

## 2021-10-26 NOTE — PROGRESS NOTE ADULT - ASSESSMENT
41y.o.  @ 39.4wks, GBS pos, IOL at term, Type 2 DM, diet controlled, with preeclampsia, with category 2 tracing undergoing resuscitation   - continue to resuscitate as needed  - Cont EFM/[place]  - IV Hydration  - Pain Management prn  - Monitor vitals q15  - Clear Liquids  - Cont amp for gbs ppx  - Cont pitocin for IOL, currently at 10mu/min with increased once safe to do so    Dr Pedersen and Dr Wheeler to be made aware

## 2021-10-26 NOTE — OB PROVIDER H&P - HISTORY OF PRESENT ILLNESS
# [id number]    Pt is a 41y.o.  @ 39.4wks by LMP presents for IOL. Pt is Type 2 DM, diet controlled. Pt reports fasting glucose 80-90. 2 hr pp <110. Pt reports she was diagnosed with type 2 DM 3 yrs ago, never on meds. Pt has elevated BP today, denies h/o HTN. Pt denies HA, visual changes or epigastric pain. Pt denies ctx, LOF or VB and reports good FM     Pt tested negative for Covid-19 on 10/23/2021

## 2021-10-26 NOTE — OB PROVIDER H&P - NS_OBGYNHISTORY_OBGYN_ALL_OB_FT
x 2, FT in [place], no weights available    Denies STI, PID, abnl PAP, fibroids, cysts  x 2, FT born [place] home [place], pt does not know weights    Denies STI, PID, abnl PAP, fibroids, cysts

## 2021-10-26 NOTE — OB RN PATIENT PROFILE - NS_OBGYNHISTORY_OBGYN_ALL_OB_FT
x 2, FT born at home in  [place], pt does not know weights    Denies STI, PID, abnl PAP, fibroids, cysts

## 2021-10-26 NOTE — PROGRESS NOTE ADULT - ASSESSMENT
41y.o.  @ 39.4wks, GBS pos, IOL at term, Type 2 DM, diet and well controlled, with elevated BPs r/o gHTN vs preeclampsia, doing well.     - Cont EFM/[place]  - IV Hydration  - Pain Management prn  - Monitor vitals q15  - Clear Liquids  - Cont amp for gbs ppx  - Cont pitocin for IOL  - F/u PELs    Dr Logan and Dr Wheeler aware

## 2021-10-26 NOTE — PROGRESS NOTE ADULT - ASSESSMENT
41y.o.  @ 39.4wks, GBS pos, IOL at term, Type 2 DM, diet and well controlled, with elevated BPs r/o gHTN vs preeclampsia, doing well, currently on 20mU of pitocin.     - Cont EFM/[place]  - IV Hydration  - Pain Management prn  - Monitor vitals q15  - Clear Liquids  - Cont amp for gbs ppx  - Cont pitocin for IOL, currently at 20mU  - F/u UrPrCr    Dr Logan and Dr Wheeler to be made aware

## 2021-10-26 NOTE — OB PROVIDER DELIVERY SUMMARY - NSPROVIDERDELIVERYNOTE_OBGYN_ALL_OB_FT
Patient was fully dilated and pushing. Fetal head was OA and restituted to LOT. The anterior and posterior shoulders delivered, followed by the remaining body atraumatically. The  was handed to the mother and RN. Delayed cord clamping was performed, and then clamped and cut. Cord blood gases collected x2. The placenta delivered intact with membranes. Pitocin was administered. Uterus massaged, fundus found to be firm. Cervix, vagina and perineum inspected no lacerations in the usual fashion with good hemostasis.     Viable male infant delivered, weighing 6lbs 11oz, with APGARs 9/9    Lacteration: no lacerations  EBL 250cc    Dr. Wheeler present for the delivery Patient was fully dilated and pushing. Fetal head was OA and restituted to LOT. The anterior and posterior shoulders delivered, followed by the remaining body atraumatically. The  was handed to the mother and RN. Delayed cord clamping was performed, and then clamped and cut. Cord blood gases collected x2. The placenta delivered intact with membranes. Pitocin was administered. Uterus massaged, fundus found to be firm. Cervix, vagina and perineum inspected no lacerations in the usual fashion with good hemostasis.     Viable male infant delivered, weighing 6lbs 11oz, with APGARs 9/9    Lacteration: no lacerations  EBL 250cc    Dr. Wheeler present for the delivery    I was physically present for the key portions of the evaluation and management (E/M) service provided. I personally had a face to face encounter with the patient. I agree with the above history, physical and plan which I have reviewed and edited where appropriate.

## 2021-10-26 NOTE — OB PROVIDER H&P - NSHPPHYSICALEXAM_GEN_ALL_CORE
T(C): --  HR: 96 (10-26-21 @ 08:11) (96 - 111)  BP: 155/88 (10-26-21 @ 08:11) (155/88 - 176/107)  RR: --  SpO2: -- T(C): 37.3 (10-26-21 @ 07:52), Max: 37.3 (10-26-21 @ 07:52)  HR: 96 (10-26-21 @ 08:35) (95 - 111)  BP: 143/73 (10-26-21 @ 08:35) (143/73 - 176/107)  RR: 20 (10-26-21 @ 07:52) (20 - 20)  SpO2: --    Abd: gravid, soft, NT neg epigastric pain  VE: FT/50/-3/ soft/post  Ctx: no ctx noted  FHR: 145 moderate variability, Cat I T(C): 37.3 (10-26-21 @ 07:52), Max: 37.3 (10-26-21 @ 07:52)  HR: 96 (10-26-21 @ 08:35) (95 - 111)  BP: 143/73 (10-26-21 @ 08:35) (143/73 - 176/107)  RR: 20 (10-26-21 @ 07:52) (20 - 20)  SpO2: --    Abd: gravid, soft, NT neg epigastric pain  VE: 1/50/-3/ soft/post  Ctx: no ctx noted  FHR: 145 moderate variability, Cat I  Ext: neg edema

## 2021-10-27 ENCOUNTER — TRANSCRIPTION ENCOUNTER (OUTPATIENT)
Age: 41
End: 2021-10-27

## 2021-10-27 ENCOUNTER — APPOINTMENT (OUTPATIENT)
Dept: OBGYN | Facility: CLINIC | Age: 41
End: 2021-10-27

## 2021-10-27 VITALS
HEART RATE: 84 BPM | DIASTOLIC BLOOD PRESSURE: 76 MMHG | SYSTOLIC BLOOD PRESSURE: 140 MMHG | RESPIRATION RATE: 18 BRPM | TEMPERATURE: 97 F

## 2021-10-27 LAB
ALBUMIN SERPL ELPH-MCNC: 3.1 G/DL — LOW (ref 3.5–5.2)
ALP SERPL-CCNC: 192 U/L — HIGH (ref 30–115)
ALT FLD-CCNC: 14 U/L — SIGNIFICANT CHANGE UP (ref 0–41)
ANION GAP SERPL CALC-SCNC: 13 MMOL/L — SIGNIFICANT CHANGE UP (ref 7–14)
AST SERPL-CCNC: 20 U/L — SIGNIFICANT CHANGE UP (ref 0–41)
BILIRUB SERPL-MCNC: 0.3 MG/DL — SIGNIFICANT CHANGE UP (ref 0.2–1.2)
BUN SERPL-MCNC: 7 MG/DL — LOW (ref 10–20)
CALCIUM SERPL-MCNC: 8.7 MG/DL — SIGNIFICANT CHANGE UP (ref 8.5–10.1)
CHLORIDE SERPL-SCNC: 108 MMOL/L — SIGNIFICANT CHANGE UP (ref 98–110)
CO2 SERPL-SCNC: 18 MMOL/L — SIGNIFICANT CHANGE UP (ref 17–32)
COVID-19 SPIKE DOMAIN AB INTERP: POSITIVE
COVID-19 SPIKE DOMAIN ANTIBODY RESULT: >250 U/ML — HIGH
CREAT SERPL-MCNC: 0.5 MG/DL — LOW (ref 0.7–1.5)
GLUCOSE BLDC GLUCOMTR-MCNC: 122 MG/DL — HIGH (ref 70–99)
GLUCOSE SERPL-MCNC: 66 MG/DL — LOW (ref 70–99)
HCT VFR BLD CALC: 35.8 % — LOW (ref 37–47)
HGB BLD-MCNC: 12.1 G/DL — SIGNIFICANT CHANGE UP (ref 12–16)
LDH SERPL L TO P-CCNC: 309 — HIGH (ref 50–242)
MCHC RBC-ENTMCNC: 31.1 PG — HIGH (ref 27–31)
MCHC RBC-ENTMCNC: 33.8 G/DL — SIGNIFICANT CHANGE UP (ref 32–37)
MCV RBC AUTO: 92 FL — SIGNIFICANT CHANGE UP (ref 81–99)
NRBC # BLD: 0 /100 WBCS — SIGNIFICANT CHANGE UP (ref 0–0)
PLATELET # BLD AUTO: 130 K/UL — SIGNIFICANT CHANGE UP (ref 130–400)
POTASSIUM SERPL-MCNC: 3.7 MMOL/L — SIGNIFICANT CHANGE UP (ref 3.5–5)
POTASSIUM SERPL-SCNC: 3.7 MMOL/L — SIGNIFICANT CHANGE UP (ref 3.5–5)
PROT SERPL-MCNC: 5.9 G/DL — LOW (ref 6–8)
RBC # BLD: 3.89 M/UL — LOW (ref 4.2–5.4)
RBC # FLD: 13.5 % — SIGNIFICANT CHANGE UP (ref 11.5–14.5)
SARS-COV-2 IGG+IGM SERPL QL IA: >250 U/ML — HIGH
SARS-COV-2 IGG+IGM SERPL QL IA: POSITIVE
SODIUM SERPL-SCNC: 139 MMOL/L — SIGNIFICANT CHANGE UP (ref 135–146)
URATE SERPL-MCNC: 3.8 MG/DL — SIGNIFICANT CHANGE UP (ref 2.5–7)
WBC # BLD: 8.99 K/UL — SIGNIFICANT CHANGE UP (ref 4.8–10.8)
WBC # FLD AUTO: 8.99 K/UL — SIGNIFICANT CHANGE UP (ref 4.8–10.8)

## 2021-10-27 PROCEDURE — 99238 HOSP IP/OBS DSCHRG MGMT 30/<: CPT

## 2021-10-27 RX ORDER — SIMETHICONE 80 MG/1
1 TABLET, CHEWABLE ORAL
Qty: 60 | Refills: 0
Start: 2021-10-27 | End: 2021-11-05

## 2021-10-27 RX ORDER — IBUPROFEN 200 MG
1 TABLET ORAL
Qty: 40 | Refills: 0
Start: 2021-10-27 | End: 2021-11-05

## 2021-10-27 RX ORDER — IBUPROFEN 200 MG
1 TABLET ORAL
Qty: 40 | Refills: 0
Start: 2021-10-27 | End: 2025-06-21

## 2021-10-27 RX ADMIN — Medication 975 MILLIGRAM(S): at 16:51

## 2021-10-27 RX ADMIN — Medication 600 MILLIGRAM(S): at 11:30

## 2021-10-27 RX ADMIN — Medication 600 MILLIGRAM(S): at 19:21

## 2021-10-27 RX ADMIN — Medication 600 MILLIGRAM(S): at 06:21

## 2021-10-27 RX ADMIN — SODIUM CHLORIDE 3 MILLILITER(S): 9 INJECTION INTRAMUSCULAR; INTRAVENOUS; SUBCUTANEOUS at 06:13

## 2021-10-27 RX ADMIN — Medication 600 MILLIGRAM(S): at 01:05

## 2021-10-27 RX ADMIN — Medication 600 MILLIGRAM(S): at 00:35

## 2021-10-27 RX ADMIN — Medication 1 TABLET(S): at 11:30

## 2021-10-27 RX ADMIN — Medication 600 MILLIGRAM(S): at 15:00

## 2021-10-27 RX ADMIN — Medication 325 MILLIGRAM(S): at 15:44

## 2021-10-27 NOTE — DISCHARGE NOTE OB - CARE PROVIDER_API CALL
Raquel Wheeler)  Obstetrics and Gynecology  [address] [place] [address]  Phone: [phone]  Fax: [phone]  Follow Up Time: 1 week

## 2021-10-27 NOTE — PROGRESS NOTE ADULT - ASSESSMENT
41y now P3, s/p  PPD 1, Type 2 DM v GDMA1, preeclampsia without severe features, doing well.     -Monitor vitals  -Normotensive postpartum, asymptomatic  -Pain management PRN  -Encourage ambulation  -PO hydration  -Criteria met for preeclampsia, UPrCr 0.4  -F/u 0430 postpartum labs and PELs  -To complete 75g 2hr oGTT 4-8 weeks postpartum  -Anticipate dc home tomorrow    Dr Merino and OB attending to be made aware. 41y now P3, s/p  PPD 1, Type 2 DM v GDMA1, preeclampsia without severe features, doing well.     -Monitor vitals  -Criteria met for preeclampsia, UPrCr 0.4  -Nonsevere BP range postpartum, asymptomatic  -Pain management PRN  -Encourage ambulation  -PO hydration  -F/u 0430 postpartum labs and PELs  -To complete 75g 2hr oGTT 4-8 weeks postpartum  -Setup VNS services for home blood pressure checks  -Anticipate dc home tomorrow with instructions to f/u in 1 week for BP check then in 6 weeks for PP visit    Dr Merino and OB attending to be made aware.

## 2021-10-27 NOTE — PROGRESS NOTE ADULT - SUBJECTIVE AND OBJECTIVE BOX
ID [id number]    PGY1 NOTE  Chief Complaint: Postpartum day 1    HPI: Pt doing well, pain well controlled. No overnight events, no acute complaints.   Ambulating - yes  Voiding - yes without difficulty   Diet - tolerating po   Breastfeeding and Bottlefeeding  Denies HA, changes in vision, CP, SOB, RUQ and epigastric pain, N/V, fevers, chills, LE edema.     PAST MEDICAL & SURGICAL HISTORY:  Type 2 diabetes mellitus  Dx 3 yrs ago, never on medication    No significant past surgical history    Physical Exam  Vital Signs Last 24 Hrs  T(F): 97.4 (27 Oct 2021 05:35), Max: 99.1 (26 Oct 2021 07:52)  HR: 80 (27 Oct 2021 05:35) (72 - 116)  BP: 142/79 (27 Oct 2021 05:35) (109/59 - 176/107)  RR: 16 (27 Oct 2021 05:35) (16 - 20)    Physical exam:  General - AAOx3, NAD  Heart - S1S2, RRR  Lungs - CTA BL  Abdomen:  - Soft, nontender  - Fundus firm, nontender, below the umbilicus  Pelvis/Vagina - Normal rubra lochia  Extremities - No calf tenderness, no swelling    Labs:                        13.3   6.61  )-----------( 131      ( 26 Oct 2021 08:02 )             38.7     10-26    136  |  106  |  7<L>  ----------------------------<  122<H>  4.1   |  16<L>  |  0.5<L>    Ca    8.8      26 Oct 2021 08:02    TPro  6.7  /  Alb  3.5  /  TBili  0.2  /  DBili  x   /  AST  20  /  ALT  15  /  AlkPhos  232<H>  10-26      ABO RH Interpretation: O POS (10-26-21 @ 08:02)  Antibody Screen: NEG (10-26-21 @ 08:02)    Prenatal Syphilis Test: Nonreact (10-26-21 @ 08:02)    MEDICATIONS  (STANDING):  acetaminophen     Tablet .. 975 milliGRAM(s) Oral <User Schedule>  diphtheria/tetanus/pertussis (acellular) Vaccine (ADAcel) 0.5 milliLiter(s) IntraMuscular once  ibuprofen  Tablet. 600 milliGRAM(s) Oral every 6 hours  ketorolac   Injectable 30 milliGRAM(s) IV Push once  prenatal multivitamin 1 Tablet(s) Oral daily  sodium chloride 0.9% lock flush 3 milliLiter(s) IV Push every 8 hours    MEDICATIONS  (PRN):  benzocaine 20%/menthol 0.5% Spray 1 Spray(s) Topical every 6 hours PRN for Perineal discomfort  dibucaine 1% Ointment 1 Application(s) Topical every 6 hours PRN Perineal discomfort  diphenhydrAMINE 25 milliGRAM(s) Oral every 6 hours PRN Pruritus  hydrocortisone 1% Cream 1 Application(s) Topical every 6 hours PRN Moderate Pain (4-6)  lanolin Ointment 1 Application(s) Topical every 6 hours PRN nipple soreness  magnesium hydroxide Suspension 30 milliLiter(s) Oral two times a day PRN Constipation  oxyCODONE    IR 5 milliGRAM(s) Oral every 3 hours PRN Moderate to Severe Pain (4-10)  oxyCODONE    IR 5 milliGRAM(s) Oral once PRN Moderate to Severe Pain (4-10)  pramoxine 1%/zinc 5% Cream 1 Application(s) Topical every 4 hours PRN Moderate Pain (4-6)  simethicone 80 milliGRAM(s) Chew every 4 hours PRN Gas  witch hazel Pads 1 Application(s) Topical every 4 hours PRN Perineal discomfort      
PGY1 Note    Patient seen at bedside for evaluation of labor progression, doing well, no complaints. Denies fevers, chills, SOB, CP, abdominal pain or heavy vaginal bleeding    T(F): 98.96 (10-26-21 @ 08:02), Max: 99.1 (10-26-21 @ 07:52)  HR: 92 (10-26-21 @ 12:04) (83 - 116)  BP: 135/80 (10-26-21 @ 12:04) (116/74 - 176/107)  RR: 20 (10-26-21 @ 08:02) (20 - 20)    EFM: 145/mod/accels  TOCO: q1-2  SVE: /-3 @ 11:22am    Medications:  ampicillin  IVPB: 216 mL/Hr (10-26-21 @ 08:16)  ampicillin  IVPB: 108 mL/Hr (10-26-21 @ 12:13)  oxytocin Infusion.: 2 mL/Hr (10-26-21 @ 09:11) Currently on 20mU      Labs:                        13.3   6.61  )-----------( 131      ( 26 Oct 2021 08:02 )             38.7     10-26    136  |  106  |  7<L>  ----------------------------<  122<H>  4.1   |  16<L>  |  0.5<L>    Ca    8.8      26 Oct 2021 08:02    TPro  6.7  /  Alb  3.5  /  TBili  0.2  /  DBili  x   /  AST  20  /  ALT  15  /  AlkPhos  232<H>  10-26    ABO RH Interpretation: O POS (10-26-21 @ 08:02)    Antibody Screen: NEG (10-26-21 @ 08:02)    Urinalysis Basic - ( 26 Oct 2021 08:02 )    Color: Colorless / Appearance: Clear / S.004 / pH: x  Gluc: x / Ketone: Negative  / Bili: Negative / Urobili: <2 mg/dL   Blood: x / Protein: Negative / Nitrite: Negative   Leuk Esterase: Negative / RBC: x / WBC x   Sq Epi: x / Non Sq Epi: x / Bacteria: x      L&amp;D Drug Screen, Urine: Done (10-26-21 @ 08:02)    Prenatal Syphilis Test: Nonreact (10-26-21 @ 08:02)    Uric Acid, Serum: 3.6 mg/dL (10-26-21 @ 08:02)    Lactate Dehydrogenase, Serum: 433 (10-26-21 @ 08:02)      
PGY2 Note    Patient seen at bedside. No complaints at the moment. patient was repositioned and pitocin was decreased to 10mu/min    T(F): 98.96 (10-26 @ 08:02), Max: 99.1 (10-26 @ 07:52)  HR: 101 (10-26 @ 14:19)  BP: 141/- (10-26 @ 14:19) (116/74 - 176/107)  RR: 20 (10-26 @ 08:02)  EFM: 145bpm/moderate variability/+accelerations/+variable deceleration  TOCO: q3mins  SVE: 4/80/-2 vtx ruptured    Medications:    ampicillin  IVPB: 216 (10-26 @ 08:16)  ampicillin  IVPB: 108 (10-26 @ 12:13)  oxytocin Infusion.: 2 (10-26 @ 09:11) now at 10mu/min      Labs:                        13.3   6.61  )-----------( 131      ( 26 Oct 2021 08:02 )             38.7     10-26    136  |  106  |  7<L>  ----------------------------<  122<H>  4.1   |  16<L>  |  0.5<L>    Ca    8.8      26 Oct 2021 08:02    TPro  6.7  /  Alb  3.5  /  TBili  0.2  /  DBili  x   /  AST  20  /  ALT  15  /  AlkPhos  232<H>  10-26    Prenatal Syphilis Test: Nonreact (10-26 @ 08:02)  Uric Acid, Serum: 3.6 mg/dL (10-26 @ 08:02)  Antibody Screen: NEG (10-26-21 @ 08:02)    Urinalysis Basic - ( 26 Oct 2021 08:02 )    Color: Colorless / Appearance: Clear / S.004 / pH: x  Gluc: x / Ketone: Negative  / Bili: Negative / Urobili: <2 mg/dL   Blood: x / Protein: Negative / Nitrite: Negative   Leuk Esterase: Negative / RBC: x / WBC x   Sq Epi: x / Non Sq Epi: x / Bacteria: x        UrPrCr: 0.4  
Pt evaluated at bedside, tolerating ctx well. Declines pain management at this time.    T(C): 37.2 (10-26-21 @ 08:02), Max: 37.3 (10-26-21 @ 07:52)  HR: 107 (10-26-21 @ 13:04) (83 - 116)  BP: 161/- (10-26-21 @ 13:04) (116/74 - 176/107)  RR: 20 (10-26-21 @ 08:02) (20 - 20)    VE: 2/50/-3  AROM, clear fluid  Ctx: q 3 min  FHR: 140 moderate variability, Cat I    Meds:   Pitocin @ 20 milliunits  Ampicillin 2 gms at 0816, 1 gram at 1213    Labs:               13.3                 136  | 16   | 7            6.61  >-----------< 131     ------------------------< 122                   38.7                 4.1  | 106  | 0.5                                          Ca 8.8   Mg x     Ph x        AST 20, ALT 15, Uric acid 3.6  Protein/ Creatinine ration 0.4  RPR nonreactive  O positive    A/P:  41y.o.  @ 39.4wks IOL at term, AMA, GBS positive, Type 2 DM diet controlled, Preeclampsia without severe features  - Continuous efm and toco  - Continue Pitocin  - Serial BPs  - Dr. Pedersen/ Dr. Wheeler aware  
Pt underwent an uncomplicated . See resident's notes for details
PGY1 Note    S: Patient seen and examined at bedside. Doing well, pain well tolerated at this time.     Vital Signs Last 24 Hrs  T(C): 37.2 (26 Oct 2021 08:02), Max: 37.3 (26 Oct 2021 07:52)  T(F): 98.96 (26 Oct 2021 08:02), Max: 99.1 (26 Oct 2021 07:52)  HR: 90 (26 Oct 2021 10:22) (84 - 111)  BP: 150/86 (26 Oct 2021 10:04) (116/74 - 176/107)   RR: 20 (26 Oct 2021 08:02) (20 - 20)    EFM: 145/mod candi/pos accel  TOCO: q2-3  SVE: deferred at this time, last exam @0906 /-3, vtx, intact    Labs:                        13.3   6.61  )-----------( 131      ( 26 Oct 2021 08:02 )             38.7       10-26    136  |  106  |  7<L>  ----------------------------<  122<H>  4.1   |  16<L>  |  0.5<L>    Ca    8.8      26 Oct 2021 08:02    TPro  6.7  /  Alb  3.5  /  TBili  0.2  /  DBili  x   /  AST  20  /  ALT  15  /  AlkPhos  232<H>  10-26    ABO RH Interpretation: O POS (10-26-21 @ 08:02)    Urinalysis Basic - ( 26 Oct 2021 08:02 )    Color: Colorless / Appearance: Clear / S.004 / pH: x  Gluc: x / Ketone: Negative  / Bili: Negative / Urobili: <2 mg/dL   Blood: x / Protein: Negative / Nitrite: Negative   Leuk Esterase: Negative / RBC: x / WBC x   Sq Epi: x / Non Sq Epi: x / Bacteria: x    Prenatal Syphilis Test: Nonreact (10-26-21 @ 08:02)    UDS: pending  Covid: neg    Meds:  Pitocin: 0915 , now at 10mu/min  Amp: 0816

## 2021-10-27 NOTE — DISCHARGE NOTE OB - PATIENT PORTAL LINK FT
You can access the FollowMyHealth Patient Portal offered by [facility] by registering at the following website: [email or web address]. By joining [employer]’s FollowMyHealth portal, you will also be able to view your health information using other applications (apps) compatible with our system.

## 2021-10-27 NOTE — DISCHARGE NOTE OB - HOSPITAL COURSE
Patient admitted for induction of labor, diagnosed with preeclampsia without severe features, underwent uncomplicated vaginal delivery and had an uncomplicated postpartum course, discharged on PPD 2.

## 2021-10-27 NOTE — DISCHARGE NOTE OB - CARE PLAN
1 Principal Discharge DX:	Normal spontaneous vaginal delivery  Assessment and plan of treatment:	No heavy lifting.   Nothing inside the vagina for 6 weeks: no tampons, douching, sexual intercourse, tub baths or pools.   If you have a fever over 100.4F, severe abdominal pain or heavy vaginal bleeding please call your doctor or go to the emergency room.  Please follow up with your OBGYN in 6 weeks for a postpartum visit.  Secondary Diagnosis:	Preeclampsia  Assessment and plan of treatment:	If you have a severe headache, blurry vision, chest pain, shortness of breath, severe abdominal pain or body swelling, please call your doctor or come to the emergency room. If your blood pressure is ever >160 on top of >110 on the bottom, please call your doctor or come to the emergency room.  Secondary Diagnosis:	Gestational diabetes  Assessment and plan of treatment:	Please complete the 75g 2 hour oral glucose tolerance test 4-8 weeks postpartum. Ask your doctor for a prescription.

## 2021-10-27 NOTE — DISCHARGE NOTE NURSING/CASE MANAGEMENT/SOCIAL WORK - NSDCPEFALRISK_GEN_ALL_CORE
For information on Fall & injury Prevention, visit [email or web address]

## 2021-10-27 NOTE — DISCHARGE NOTE OB - PLAN OF CARE
No heavy lifting.   Nothing inside the vagina for 6 weeks: no tampons, douching, sexual intercourse, tub baths or pools.   If you have a fever over 100.4F, severe abdominal pain or heavy vaginal bleeding please call your doctor or go to the emergency room.  Please follow up with your OBGYN in 6 weeks for a postpartum visit. If you have a severe headache, blurry vision, chest pain, shortness of breath, severe abdominal pain or body swelling, please call your doctor or come to the emergency room. If your blood pressure is ever >160 on top of >110 on the bottom, please call your doctor or come to the emergency room. Please complete the 75g 2 hour oral glucose tolerance test 4-8 weeks postpartum. Ask your doctor for a prescription.

## 2021-10-27 NOTE — DISCHARGE NOTE NURSING/CASE MANAGEMENT/SOCIAL WORK - PATIENT PORTAL LINK FT
You can access the FollowMyHealth Patient Portal offered by [facility] by registering at the following website: [email or web address]. By joining [employer]’s FollowMyHealth portal, you will also be able to view your health information using other applications (apps) compatible with our system.
no anomalies noted

## 2021-10-28 PROBLEM — E11.9 TYPE 2 DIABETES MELLITUS WITHOUT COMPLICATIONS: Chronic | Status: ACTIVE | Noted: 2021-10-26

## 2021-11-02 ENCOUNTER — APPOINTMENT (OUTPATIENT)
Dept: ANTEPARTUM | Facility: CLINIC | Age: 41
End: 2021-11-02

## 2021-11-02 DIAGNOSIS — Z3A.39 39 WEEKS GESTATION OF PREGNANCY: ICD-10-CM

## 2021-11-08 ENCOUNTER — OUTPATIENT (OUTPATIENT)
Dept: OUTPATIENT SERVICES | Facility: HOSPITAL | Age: 41
LOS: 1 days | Discharge: HOME | End: 2021-11-08

## 2021-11-08 ENCOUNTER — APPOINTMENT (OUTPATIENT)
Dept: OBGYN | Facility: CLINIC | Age: 41
End: 2021-11-08
Payer: MEDICAID

## 2021-11-08 VITALS
DIASTOLIC BLOOD PRESSURE: 90 MMHG | HEIGHT: 59 IN | WEIGHT: 134 LBS | SYSTOLIC BLOOD PRESSURE: 138 MMHG | BODY MASS INDEX: 27.01 KG/M2

## 2021-11-08 VITALS — SYSTOLIC BLOOD PRESSURE: 133 MMHG | DIASTOLIC BLOOD PRESSURE: 83 MMHG

## 2021-11-08 VITALS — DIASTOLIC BLOOD PRESSURE: 86 MMHG | SYSTOLIC BLOOD PRESSURE: 140 MMHG

## 2021-11-08 PROCEDURE — 99212 OFFICE O/P EST SF 10 MIN: CPT

## 2021-11-08 NOTE — HISTORY OF PRESENT ILLNESS
[Delivery Date: ___] : on [unfilled] [] : delivered by vaginal delivery [Wt. ___] : weighing [unfilled] [FreeTextEntry8] : Pt here for blood pressure check, Int: levi gomez [FreeTextEntry9] : Pt had gdm and was dx with preeclampsia without severe features, here today for bp check [de-identified] : BP well controlled. [de-identified] : RV in 4 weeks for post partum visit

## 2021-11-19 ENCOUNTER — APPOINTMENT (OUTPATIENT)
Dept: GASTROENTEROLOGY | Facility: CLINIC | Age: 41
End: 2021-11-19

## 2021-12-08 ENCOUNTER — APPOINTMENT (OUTPATIENT)
Dept: OBGYN | Facility: CLINIC | Age: 41
End: 2021-12-08
Payer: MEDICAID

## 2021-12-08 ENCOUNTER — OUTPATIENT (OUTPATIENT)
Dept: OUTPATIENT SERVICES | Facility: HOSPITAL | Age: 41
LOS: 1 days | Discharge: HOME | End: 2021-12-08

## 2021-12-08 VITALS
DIASTOLIC BLOOD PRESSURE: 80 MMHG | HEIGHT: 59 IN | SYSTOLIC BLOOD PRESSURE: 120 MMHG | WEIGHT: 138 LBS | BODY MASS INDEX: 27.82 KG/M2

## 2021-12-08 DIAGNOSIS — O24.419 GESTATIONAL DIABETES MELLITUS IN PREGNANCY, UNSPECIFIED CONTROL: ICD-10-CM

## 2021-12-08 DIAGNOSIS — O09.90 SUPERVISION OF HIGH RISK PREGNANCY, UNSPECIFIED, UNSPECIFIED TRIMESTER: ICD-10-CM

## 2021-12-08 DIAGNOSIS — O09.521 SUPERVISION OF ELDERLY MULTIGRAVIDA, FIRST TRIMESTER: ICD-10-CM

## 2021-12-08 DIAGNOSIS — Z30.42 ENCOUNTER FOR SURVEILLANCE OF INJECTABLE CONTRACEPTIVE: ICD-10-CM

## 2021-12-08 DIAGNOSIS — O24.919 UNSPECIFIED DIABETES MELLITUS IN PREGNANCY, UNSPECIFIED TRIMESTER: ICD-10-CM

## 2021-12-08 LAB
HCG UR QL: NEGATIVE
QUALITY CONTROL: YES

## 2021-12-08 RX ADMIN — MEDROXYPROGESTERONE ACETATE 0 MG/ML: 150 INJECTION, SUSPENSION INTRAMUSCULAR at 00:00

## 2021-12-08 NOTE — HISTORY OF PRESENT ILLNESS
[Postpartum Follow Up] : postpartum follow up [Last Pap Date: ___] : Last Pap Date: [unfilled] [Delivery Date: ___] : on [unfilled] [] : delivered by vaginal delivery [Discharge HCT: ___] : hematocrit level was [unfilled] [Discharge HGB: ___] : hemoglobin level was [unfilled] [Breastfeeding] : currently nursing [Doing Well] : is doing well [Excellent Pain Control] : has excellent pain control [Intended Contraception] : Intended Contraception: [Medroxyprogesterone Injection] : medroxyprogesterone acetate injection [Back to Normal] : is back to normal in size [None] : no vaginal bleeding [Normal] : the vagina was normal [Examination Of The Breasts] : breasts are normal [BF with Difficulty] : nursing without difficulty [Resumed Menses] : has not resumed her menses [Resumed [place]] : has not resumed intercourse [Abdominal Pain] : no abdominal pain [Breast Pain] : no breast pain [BreastFeeding Problems] : no breastfeeding problems [Chest Pain] : no chest pain [FreeTextEntry9] : GDM, preeclampsia without severe features [de-identified] : Denies preeclamptic symptoms such as headache, vision change, chest pain, SOB, n/v, RUQ/epigastric, swelling.  [de-identified] : s/p Depo today (Lot #[id number], Exp [id number]) [de-identified] : GTT for h/o GDM, desires Depo for contraception, RTC in 3 months for Depo

## 2021-12-25 NOTE — OB RN PATIENT PROFILE - PARENTS VERBALIZED UNDERSTANDING OF THE SAFE SKIN TO SKIN POSITIONING OF THE NEWBORN.
Statement Selected
I have personally provided the amount of critical care time documented below excluding time spent on separate procedures.

## 2021-12-26 ENCOUNTER — EMERGENCY (EMERGENCY)
Facility: HOSPITAL | Age: 41
LOS: 0 days | Discharge: HOME | End: 2021-12-26
Attending: EMERGENCY MEDICINE | Admitting: EMERGENCY MEDICINE
Payer: MEDICAID

## 2021-12-26 VITALS
HEIGHT: 58 IN | DIASTOLIC BLOOD PRESSURE: 86 MMHG | SYSTOLIC BLOOD PRESSURE: 138 MMHG | HEART RATE: 95 BPM | OXYGEN SATURATION: 100 % | TEMPERATURE: 99 F | RESPIRATION RATE: 18 BRPM

## 2021-12-26 DIAGNOSIS — N64.4 MASTODYNIA: ICD-10-CM

## 2021-12-26 DIAGNOSIS — N61.0 MASTITIS WITHOUT ABSCESS: ICD-10-CM

## 2021-12-26 DIAGNOSIS — E11.9 TYPE 2 DIABETES MELLITUS WITHOUT COMPLICATIONS: ICD-10-CM

## 2021-12-26 PROCEDURE — 93010 ELECTROCARDIOGRAM REPORT: CPT

## 2021-12-26 PROCEDURE — 99284 EMERGENCY DEPT VISIT MOD MDM: CPT

## 2021-12-26 RX ORDER — ACETAMINOPHEN 500 MG
650 TABLET ORAL ONCE
Refills: 0 | Status: COMPLETED | OUTPATIENT
Start: 2021-12-26 | End: 2021-12-26

## 2021-12-26 RX ORDER — CEPHALEXIN 500 MG
3 CAPSULE ORAL
Qty: 90 | Refills: 0
Start: 2021-12-26 | End: 2022-01-04

## 2021-12-26 RX ADMIN — Medication 650 MILLIGRAM(S): at 17:14

## 2021-12-26 NOTE — ED PROVIDER NOTE - NSFOLLOWUPINSTRUCTIONS_ED_ALL_ED_FT
Mastitis    Mastitis       La mastitis es la irritación e hinchazón (inflamación) de alyssa jarek de las mamas. La causa es alyssa infección que se produce cuando entran microbios (bacterias) en la piel. La mayoría de las veces, se manifiesta en madres en período de lactancia, berenice puede ocurrirle a cualquier og y a algunos hombres.      ¿Cuáles son las causas?  Las causas de esta afección son:  •Gérmenes. [place] puede ocurrir cuando los microbios entran en la mama a través de tracy o aberturas en la piel.      •Obstrucción de un conducto galactóforo. [place] sucede cuando algo obstruye el flujo de leche en la mama.      •Un piercing en los pezones. La jarek perforada puede permitir que entren microbios en la mama.      •Algunos tipos de cáncer de mama.        ¿Cuáles son los signos o síntomas?    •Hinchazón, enrojecimiento y dolor en la mama.      •Hinchazón de los ganglios que se encuentran debajo del brazo.       •Líquido que sale del pezón.      •Fiebre.      •Escalofríos.      •Vomitar o tener ganas de vomitar.      •Frecuencia cardíaca acelerada.       •Mucho cansancio.       •Dolor de sole.      •Kelsea musculares.      •Acumulación de pus. [place] también se denomina absceso.        ¿Cómo se trata?  El tratamiento de esta afección puede incluir:  •Usar compresas calientes o frías.      •Tawana analgésicos.      •Recibir antibióticos.      • Reposo.      • Beber mucho líquido.      •Extraer el líquido con alyssa aguja, si se ha formado un absceso.        Siga estas instrucciones en hobbs casa:      Si está amamantando:    •Amamante a hobbs bebé o use el sacaleches para vaciar las mamas.  •Pregunte a hobbs médico si debe hacer cambios en hobbs rutina de amamantamiento o extracción de leche.        •Estevan el amamantamiento, vacíe la primera mama antes de amamantar con la otra. Use un sacaleches si el bebé no le vacía las mamas.      •Mantenga los pezones secos y limpios.      •Masajee las mamas mientras amamanta al bebé o usa el sacaleches, kee se lo haya indicado el médico.      •Si se le indica, aplique calor húmedo en el área afectada de la mama, genaro antes de amamantar o de extraer leche. Use la dewayne de calor que el médico le indique.    •Si se le indica, coloque hielo en la jarek afectada de la mama inmediatamente después de amamantar o extraerse leche. Para hacer esto:  •Ponga el hielo en alyssa bolsa plástica.      •Coloque alyssa toalla entre la piel y la bolsa.      •Deje el hielo en el lugar estevan 20 minutos.      •Retire el hielo si la piel se le pone de color stokes brillante. [place] es muy importante. Si no puede sentir dolor, calor o frío, tiene un mayor riesgo de que se dañe la jarek.        •Si vuelve a trabajar, use el sacaleches mientras está en el trabajo.      •Evite que las mamas se llenen mucho de leche (congestión).      Medicamentos     •Use los medicamentos de venta tate y los recetados solamente kee se lo haya indicado el médico.      •Si le recetaron un antibiótico, tómelo kee se lo haya indicado el médico. No deje de tomarlo aunque comience a sentirse mejor.      Instrucciones generales     • No use un sostén demasiado ajustado o con rosa. Use un sostén de soporte.      •Iram suficiente líquido para mantener el pis (orina) de color amarillo pálido. [place] es importante si tiene fiebre.      •Descanse mucho.      •Concurra a todas las visitas de seguimiento.        Comuníquese con un médico si:    •Observa que sale un líquido similar a pus por la mama.      •Tiene fiebre.      •Los síntomas no mejoran dentro de los 2 días.        Solicite ayuda de inmediato si:    •El dolor y la hinchazón empeoran.      •El dolor no se shiva con los medicamentos.      •Observa alyssa línea nadya que se extiende desde la mama hasta la axila.        Resumen    •La mastitis es la irritación e hinchazón de alyssa jarek de las mamas.      •Si le recetaron antibióticos, no los suspenda, incluso si comienza a sentirse mejor.      •Iram mucho líquido y vikash reposo.      •Comuníquese con el médico si los síntomas no mejoran en un plazo de 2 días.      Esta información no tiene kee fin reemplazar el consejo del médico. Asegúrese de hacerle al médico cualquier pregunta que tenga.

## 2021-12-26 NOTE — ED PROVIDER NOTE - ATTENDING CONTRIBUTION TO CARE
40 yo f with no pmh presents with L breast pain x 2 days.  pt says started to hurt, so stopped breastfeeding her baby from the L breast.  worse pain.  +subj fever.  no n/v/d, no abd pain, no cp or sob.  exam: nad, ncat, perrl, eomi, mmm, rrr, ctab, L breast, indurated, warm, mild erythema at the LUQ, general ttp imp: pt with L breast pain, concern for mastitis, advised to keep breastfeeding baby from L breast, massage to get rid of clogs, will start abx

## 2021-12-26 NOTE — ED PROVIDER NOTE - NSFOLLOWUPCLINICS_GEN_ALL_ED_FT
[facility]  OB/GYN  [address] [place] [address]  Phone: [phone]  Fax:

## 2021-12-26 NOTE — ED PROVIDER NOTE - OBJECTIVE STATEMENT
42 yo f with no pmh presents with L breast pain x 2 days.  pt says started to hurt, so stopped breastfeeding her baby from the L breast.  worse pain.  +subj fever.  no n/v/d, no abd pain, no cp or sob.

## 2021-12-26 NOTE — ED ADULT NURSE NOTE - NSIMPLEMENTINTERV_GEN_ALL_ED
Writer called to inform patient that [facility] will be able to provide her right breast prosthetic. She will need to call [facility] on a Monday or Wednesday and schedule a fitting time. Please also inform patient that order for lymphedema pumps will be sent to [employer], a representative will get in contact with her after orders are faxed in.    Orders and records to be faxed placed in providers in-box for signature. Also see note from appointment 3/4/21.   Implemented All Universal Safety Interventions:  [place] to call system. Call bell, personal items and telephone within reach. Instruct patient to call for assistance. Room bathroom lighting operational. Non-slip footwear when patient is off stretcher. Physically safe environment: no spills, clutter or unnecessary equipment. Stretcher in lowest position, wheels locked, appropriate side rails in place.

## 2021-12-26 NOTE — ED PROVIDER NOTE - PHYSICAL EXAMINATION
CONSTITUTIONAL: Well-developed; well-nourished; in no acute distress.   SKIN: warm, dry  HEAD: Normocephalic; atraumatic.  EYES: no conjunctival injection. PERRL.   ENT: No nasal discharge; airway clear.  NECK: Supple; non tender.  CARD: S1, S2 normal; no murmurs, gallops, or rubs. Regular rate and rhythm.   RESP: No wheezes, rales or rhonchi.  ABD: soft ntnd  EXT: Normal ROM.  No clubbing, cyanosis or edema.   LYMPH: No acute cervical adenopathy.  NEURO: Alert, oriented, grossly unremarkable  PSYCH: Cooperative, appropriate.  Breast: +left breast ttp, no induration/skin changes, abnormal nipple discharge

## 2021-12-26 NOTE — ED PROVIDER NOTE - PATIENT PORTAL LINK FT
You can access the FollowMyHealth Patient Portal offered by [facility] by registering at the following website: [email or web address]. By joining [employer]s FollowMyHealth portal, you will also be able to view your health information using other applications (apps) compatible with our system.

## 2021-12-26 NOTE — ED PROVIDER NOTE - CLINICAL SUMMARY MEDICAL DECISION MAKING FREE TEXT BOX
pt with L breast pain, concern for mastitis, advised to keep breastfeeding baby from L breast, massage to get rid of clogs, will start abx

## 2021-12-27 ENCOUNTER — APPOINTMENT (OUTPATIENT)
Dept: SURGERY | Facility: CLINIC | Age: 41
End: 2021-12-27

## 2021-12-28 NOTE — DISCHARGE NOTE OB - NSCORESITESY/N_GEN_A_CORE_RD
No
Spine appears normal, range of motion is not limited, no muscle or joint tenderness.  CHOU x 4, no focal LE edema

## 2022-02-23 ENCOUNTER — APPOINTMENT (OUTPATIENT)
Dept: OBGYN | Facility: CLINIC | Age: 42
End: 2022-02-23

## 2022-03-09 ENCOUNTER — APPOINTMENT (OUTPATIENT)
Dept: OBGYN | Facility: CLINIC | Age: 42
End: 2022-03-09

## 2022-04-19 NOTE — END OF VISIT
Attempted to contact pt regarding appt with Dr. Panda on Thursday(4/21/22) for rev PSA. PSA was not done. No answer. Left voicemail. Schedule PSA for same time as appt was to avoid pt making unnecessary trip, [place], LPN.    [FreeTextEntry3] : [facility] Staff\par \par I saw and evaluated Ms. LOBATO with Dr. Lopez and I agree with the documentation above.   I modified the note above (if indicated) and agree with its contents in the present form.\par \par GDM.  fingersticks overall well controlled on diet. Ambulate after meals.\par \par Prenatal care is with Dr. Wheeler.\par RTC at the time of her anatomy ultrasound with the fingerstick log.\par \par Jovan Pham MD\par \par \par \par \par

## 2022-08-19 NOTE — DISCHARGE NOTE OB - MEDICATION SUMMARY - MEDICATIONS TO TAKE
Normal rate, regular rhythm.  Heart sounds S1, S2.  No murmurs, rubs or gallops. I will START or STAY ON the medications listed below when I get home from the hospital:    ibuprofen 600 mg oral tablet  -- 1 tab(s) by mouth every 6 hours, As Needed   -- Indication: For pain    Prenatal Multivitamins with Folic Acid 1 mg oral tablet  -- 1 tab(s) by mouth once a day  -- Indication: For breastfeeding    simethicone 80 mg oral tablet, chewable  -- 1 tab(s) by mouth every 4 hours, As needed, Gas  -- Indication: For gas

## 2022-11-22 ENCOUNTER — EMERGENCY (EMERGENCY)
Facility: HOSPITAL | Age: 42
LOS: 0 days | Discharge: HOME | End: 2022-11-22
Attending: EMERGENCY MEDICINE | Admitting: EMERGENCY MEDICINE

## 2022-11-22 VITALS — OXYGEN SATURATION: 100 % | HEART RATE: 74 BPM | RESPIRATION RATE: 18 BRPM

## 2022-11-22 VITALS
DIASTOLIC BLOOD PRESSURE: 83 MMHG | TEMPERATURE: 96 F | OXYGEN SATURATION: 99 % | RESPIRATION RATE: 18 BRPM | WEIGHT: 137.79 LBS | HEART RATE: 100 BPM | SYSTOLIC BLOOD PRESSURE: 141 MMHG

## 2022-11-22 DIAGNOSIS — E11.9 TYPE 2 DIABETES MELLITUS WITHOUT COMPLICATIONS: ICD-10-CM

## 2022-11-22 DIAGNOSIS — B34.9 VIRAL INFECTION, UNSPECIFIED: ICD-10-CM

## 2022-11-22 DIAGNOSIS — R50.9 FEVER, UNSPECIFIED: ICD-10-CM

## 2022-11-22 DIAGNOSIS — Z20.822 CONTACT WITH AND (SUSPECTED) EXPOSURE TO COVID-19: ICD-10-CM

## 2022-11-22 DIAGNOSIS — R42 DIZZINESS AND GIDDINESS: ICD-10-CM

## 2022-11-22 DIAGNOSIS — R19.7 DIARRHEA, UNSPECIFIED: ICD-10-CM

## 2022-11-22 LAB
ALBUMIN SERPL ELPH-MCNC: 4 G/DL — SIGNIFICANT CHANGE UP (ref 3.5–5.2)
ALP SERPL-CCNC: 135 U/L — HIGH (ref 30–115)
ALT FLD-CCNC: 14 U/L — SIGNIFICANT CHANGE UP (ref 0–41)
ANION GAP SERPL CALC-SCNC: 10 MMOL/L — SIGNIFICANT CHANGE UP (ref 7–14)
AST SERPL-CCNC: 13 U/L — SIGNIFICANT CHANGE UP (ref 0–41)
BASOPHILS # BLD AUTO: 0.02 K/UL — SIGNIFICANT CHANGE UP (ref 0–0.2)
BASOPHILS NFR BLD AUTO: 0.3 % — SIGNIFICANT CHANGE UP (ref 0–1)
BILIRUB SERPL-MCNC: 0.3 MG/DL — SIGNIFICANT CHANGE UP (ref 0.2–1.2)
BUN SERPL-MCNC: 9 MG/DL — LOW (ref 10–20)
CALCIUM SERPL-MCNC: 8.3 MG/DL — LOW (ref 8.4–10.4)
CHLORIDE SERPL-SCNC: 110 MMOL/L — SIGNIFICANT CHANGE UP (ref 98–110)
CO2 SERPL-SCNC: 24 MMOL/L — SIGNIFICANT CHANGE UP (ref 17–32)
CREAT SERPL-MCNC: 0.5 MG/DL — LOW (ref 0.7–1.5)
EGFR: 121 ML/MIN/1.73M2 — SIGNIFICANT CHANGE UP
EOSINOPHIL # BLD AUTO: 0.07 K/UL — SIGNIFICANT CHANGE UP (ref 0–0.7)
EOSINOPHIL NFR BLD AUTO: 1 % — SIGNIFICANT CHANGE UP (ref 0–8)
FLUAV AG NPH QL: SIGNIFICANT CHANGE UP
FLUBV AG NPH QL: SIGNIFICANT CHANGE UP
GLUCOSE SERPL-MCNC: 79 MG/DL — SIGNIFICANT CHANGE UP (ref 70–99)
HCG SERPL QL: NEGATIVE — SIGNIFICANT CHANGE UP
HCT VFR BLD CALC: 34.1 % — LOW (ref 37–47)
HGB BLD-MCNC: 11.4 G/DL — LOW (ref 12–16)
IMM GRANULOCYTES NFR BLD AUTO: 0.1 % — SIGNIFICANT CHANGE UP (ref 0.1–0.3)
LYMPHOCYTES # BLD AUTO: 1.69 K/UL — SIGNIFICANT CHANGE UP (ref 1.2–3.4)
LYMPHOCYTES # BLD AUTO: 23.3 % — SIGNIFICANT CHANGE UP (ref 20.5–51.1)
MCHC RBC-ENTMCNC: 28.4 PG — SIGNIFICANT CHANGE UP (ref 27–31)
MCHC RBC-ENTMCNC: 33.4 G/DL — SIGNIFICANT CHANGE UP (ref 32–37)
MCV RBC AUTO: 85 FL — SIGNIFICANT CHANGE UP (ref 81–99)
MONOCYTES # BLD AUTO: 0.5 K/UL — SIGNIFICANT CHANGE UP (ref 0.1–0.6)
MONOCYTES NFR BLD AUTO: 6.9 % — SIGNIFICANT CHANGE UP (ref 1.7–9.3)
NEUTROPHILS # BLD AUTO: 4.95 K/UL — SIGNIFICANT CHANGE UP (ref 1.4–6.5)
NEUTROPHILS NFR BLD AUTO: 68.4 % — SIGNIFICANT CHANGE UP (ref 42.2–75.2)
NRBC # BLD: 0 /100 WBCS — SIGNIFICANT CHANGE UP (ref 0–0)
PLATELET # BLD AUTO: 289 K/UL — SIGNIFICANT CHANGE UP (ref 130–400)
POTASSIUM SERPL-MCNC: 4.2 MMOL/L — SIGNIFICANT CHANGE UP (ref 3.5–5)
POTASSIUM SERPL-SCNC: 4.2 MMOL/L — SIGNIFICANT CHANGE UP (ref 3.5–5)
PROT SERPL-MCNC: 7 G/DL — SIGNIFICANT CHANGE UP (ref 6–8)
RBC # BLD: 4.01 M/UL — LOW (ref 4.2–5.4)
RBC # FLD: 14.8 % — HIGH (ref 11.5–14.5)
RSV RNA NPH QL NAA+NON-PROBE: SIGNIFICANT CHANGE UP
SARS-COV-2 RNA SPEC QL NAA+PROBE: SIGNIFICANT CHANGE UP
SODIUM SERPL-SCNC: 144 MMOL/L — SIGNIFICANT CHANGE UP (ref 135–146)
WBC # BLD: 7.24 K/UL — SIGNIFICANT CHANGE UP (ref 4.8–10.8)
WBC # FLD AUTO: 7.24 K/UL — SIGNIFICANT CHANGE UP (ref 4.8–10.8)

## 2022-11-22 PROCEDURE — 93010 ELECTROCARDIOGRAM REPORT: CPT

## 2022-11-22 PROCEDURE — 99284 EMERGENCY DEPT VISIT MOD MDM: CPT

## 2022-11-22 RX ORDER — SODIUM CHLORIDE 9 MG/ML
1000 INJECTION INTRAMUSCULAR; INTRAVENOUS; SUBCUTANEOUS ONCE
Refills: 0 | Status: COMPLETED | OUTPATIENT
Start: 2022-11-22 | End: 2022-11-22

## 2022-11-22 RX ORDER — MECLIZINE HCL 12.5 MG
50 TABLET ORAL ONCE
Refills: 0 | Status: COMPLETED | OUTPATIENT
Start: 2022-11-22 | End: 2022-11-22

## 2022-11-22 RX ADMIN — Medication 50 MILLIGRAM(S): at 19:53

## 2022-11-22 RX ADMIN — SODIUM CHLORIDE 1000 MILLILITER(S): 9 INJECTION INTRAMUSCULAR; INTRAVENOUS; SUBCUTANEOUS at 19:53

## 2022-11-22 NOTE — ED ADULT NURSE REASSESSMENT NOTE - NS ED NURSE REASSESS COMMENT FT1
Patient assessed bedside.  A/O x 4.  No S/S of acute pain or distress at this time.  Pt safety and comfort maintained.

## 2022-11-22 NOTE — ED PROVIDER NOTE - PATIENT PORTAL LINK FT
You can access the FollowMyHealth Patient Portal offered by [facility] by registering at the following website: [email or web address]. By joining [employer]s FollowMyHealth portal, you will also be able to view your health information using other applications (apps) compatible with our system.

## 2022-11-22 NOTE — ED PROVIDER NOTE - ATTENDING APP SHARED VISIT CONTRIBUTION OF CARE
42yF p/w flu-like sx, including fever, myalgias, headache, n/v/d and abd pain.  +sick contacts.  Pt is tolerating some PO.  PMH includes T2DM vs gestational DM but sugars have been WNL while not pregnant.

## 2022-11-22 NOTE — ED PROVIDER NOTE - CLINICAL SUMMARY MEDICAL DECISION MAKING FREE TEXT BOX
42yF p/w flu-like sx, including subjective fever, chills, myalgias, URI, n/v/d and abd pain.  Pt nontoxic appearing and abd soft/benign.  Labs reassuring, including normal electrolytes and neg flu/covid/RSV.  EKG w/o ischemia or arrhythmia.  Pt tolerating PO w/o dehydration.  Recommend supportive care for presumed viral illness, o/p PCP f/u in 2-3d if not improved, return precautions. 42yF p/w flu-like sx, including subjective fever, chills, myalgias, URI, n/v/d and abd pain.  Pt nontoxic appearing and abd soft/benign.  Exam w/o focal neuro deficits.  Labs reassuring, including normal electrolytes and neg flu/covid/RSV.  EKG w/o ischemia or arrhythmia.  Pt treated w/ IV NS and meclizine.  She is tolerating PO w/o dehydration.  Recommend supportive care for presumed viral illness, o/p PCP f/u in 2-3d if not improved, return precautions.

## 2022-11-22 NOTE — ED PROVIDER NOTE - NSFOLLOWUPINSTRUCTIONS_ED_ALL_ED_FT
Stephanie un seguimiento con hobbs médico de atención primaria dentro de las 24-72 horas y regrese de inmediato si los síntomas empeoran.        Enfermedades virales en los adultos    Viral Illness, Adult      Los virus son microbios diminutos que entran en el organismo de alyssa persona y causan enfermedades. Hay muchos tipos de virus diferentes y causan muchas clases de enfermedades. Las enfermedades virales pueden ser leves o graves. Pueden afectar diferentes partes del cuerpo.    Entre las afecciones a corto plazo causadas por virus, se incluyen los resfríos y la gripe. Entre las afecciones a valeri plazo causadas por un virus, incluyen el herpes, la culebrilla y la infección por VIH (virus de inmunodeficiencia humana). Se rowe identificado unos pocos virus asociados con determinados tipos de cáncer.      ¿Cuáles son las causas?    Muchos tipos de virus pueden causar enfermedades. Los virus invaden las células del organismo, se multiplican y provocan que las células infectadas funcionen de manera anormal o mueran. Cuando estas células mueren, liberan más virus. Cuando esto ocurre, aparecen síntomas de la enfermedad, y el virus sigue diseminándose a otras células. Si el virus asume la función de la célula, puede hacer que esta se divida y prolifere de manera descontrolada. [place] ocurre cuando un virus causa cáncer.    Los diferentes virus ingresan al organismo de distintas formas. Puede contraer un virus de la siguiente manera:  •Al ingerir alimentos o beber agua que rowe entrado en contacto con el virus (están contaminados).      •Al inhalar gotitas que alyssa persona infectada liberó en el aire al toser o estornudar.      •Al tocar alyssa superficie contaminada con el virus y luego llevarse la mano a la boca, la nariz o los ojos.      •Al ser pankaj por un insecto o mordido por un animal que son portadores del virus.      •Al tener contacto sexual con alyssa persona infectada por el virus.      •Al tener contacto con linda o líquidos que contienen el virus, ya sea a través de un irma abierto o estevan alyssa transfusión.      Si el virus ingresa al organismo, el sistema de defensa del cuerpo (sistema inmunitario) intentará combatirlo. Puede correr un riesgo más alto de tener alyssa enfermedad viral si tiene el sistema inmunitario debilitado.      ¿Cuáles son los signos o síntomas?    Puede tener los siguientes síntomas, dependiendo del tipo de virus y de la ubicación de las células que invade:•Virus del resfrío y de la gripe:  •Fiebre.      •Dolor de sole.      •Dolor de garganta.      •Kelsea musculares.      •Nariz tapada (congestión nasal).      •Tos.      •Virus del aparato digestivo (gastrointestinales):  •Fiebre.      •Dolor en el abdomen.      •Náuseas.      •Diarrea.      •Virus hepáticos (hepatitis):  •Pérdida del apetito.      •Cansancio.      •La piel o las partes dameon de los ojos se ponen haseeb (ictericia).      •Virus del cerebro y la médula mock:  •Fiebre.      •Dolor de sole.      •Rigidez en el keisha.      •Náuseas y vómitos.      •Confusión o somnolencia.      •Virus de la piel:  •Verrugas.      •Picazón.      •Erupción cutánea.      •Virus de transmisión sexual:  •Secreción.      •Hinchazón.      •Enrojecimiento.      •Erupción cutánea.          ¿Cómo se diagnostica?    Esta afección se puede diagnosticar en función de lo siguiente:  •Síntomas.      •Antecedentes médicos.      •Examen físico.      •Análisis de linda, alyssa muestra de mucosidad de los pulmones (muestra de esputo), muestra de heces o un hisopado de líquidos corporales o alyssa llaga de la piel (lesión).        ¿Cómo se trata?    Los virus pueden ser difíciles de tratar porque se hospedan en las células. Los antibióticos no tratan los virus porque estos medicamentos no llegan al interior de las células. El tratamiento de alyssa enfermedad viral puede incluir lo siguiente:  •Descansar y beber abundantes líquidos.      •Medicamentos para aliviar los síntomas. Estos pueden incluir medicamentos de venta tate para el dolor y la fiebre, medicamentos para la tos o la congestión y medicamentos para aliviar la diarrea.      •Medicamentos antivirales. Estos medicamentos están disponibles únicamente para ciertos tipos de virus.      Algunas enfermedades virales pueden evitarse con vacunas. Un ejemplo frecuente es la vacuna antigripal.      Siga estas instrucciones en hobbs casa:    Medicamentos     •Use los medicamentos de venta tate y los recetados solamente kee se lo haya indicado el médico.      •Si le recetaron un medicamento antiviral, tómelo kee se lo haya indicado el médico. No deje de kristi el antiviral aunque comience a sentirse mejor.    •Infórmese sobre cuándo los antibióticos son necesarios y cuándo no. Los antibióticos no combaten a los virus. Si tiene alyssa infección viral y el médico garth que también tiene alyssa infección bacteriana, o que está en riesgo de contraerla, pam vez le recete un antibiótico.  •No solicite alyssa receta para antibióticos si le rowe diagnosticado alyssa enfermedad viral. Los antibióticos no harán que se cure más rápidamente.      •Kristi antibióticos con frecuencia cuando no son necesarios puede derivar en resistencia a los antibióticos. Cuando esto ocurre, el medicamento pierde hobbs eficacia contra las bacterias que normalmente combate.          Indicaciones generales      •Iram suficiente líquido para mantener la orina de color amarillo pálido.      •Descanse todo lo que pueda.      •Retome roslyn actividades normales según lo indicado por el médico. Pregúntele al médico qué actividades son seguras para usted.      •Concurra a todas las visitas de seguimiento kee se lo haya indicado el médico. [place] es importante.        ¿Cómo se previene?     Para reducir el riesgo de tener alyssa enfermedad viral:  •Lávese las chuck frecuentemente con agua y jabón estevan al menos 20 segundos. Use desinfectante para chuck si no dispone de agua y jabón.      •Evite tocarse la nariz, los ojos y la boca, sobre todo si no se lavó las chuck recientemente.      •Si un miembro de la jacky tiene alyssa infección viral, limpie todas las superficies de la casa que puedan papito estado en contacto con el virus. Use [place] y jabón. También puede usar lejía con agua agregada (diluido).      •Manténgase lejos de las personas enfermas con síntomas de alyssa infección viral.      •No comparta objetos tales kee cepillos de dientes y botellas de agua con otras personas.      •Mantenga las vacunas al día. [place] incluye recibir la vacuna antigripal todos los años.      •Siga alyssa dieta saludable y descanse lo suficiente.        Comuníquese con un médico si:    •Tiene síntomas de alyssa enfermedad viral que no desaparecen.      •Los síntomas regresan después de papito desaparecido.      •[place] síntomas empeoran.        Solicite ayuda de inmediato si tiene:    •Dificultad para respirar.      •Dolor de sole intenso o rigidez en el keisha.      •Vómitos abundantes o dolor en el abdomen.      Estos síntomas pueden representar un problema grave que constituye alyssa emergencia. No espere a kailey si los síntomas desaparecen. Solicite atención médica de inmediato. Comuníquese con el servicio de emergencias de hobbs localidad (911 en los [place]). No conduzca por roslyn propios medios hasta el hospital.       Resumen    •Los virus son tipos de microbios que entran en el organismo de alyssa persona y causan enfermedades. Las enfermedades virales pueden ser leves o graves. Pueden afectar diferentes partes del cuerpo.      •Los virus pueden ser difíciles de tratar. Hay medicamentos para aliviar los síntomas y hay algunos medicamentos antivirales.      •Si le recetaron un medicamento antiviral, tómelo kee se lo haya indicado el médico. No deje de kristi el antiviral aunque comience a sentirse mejor.      •Comuníquese con un médico si tiene síntomas de alyssa enfermedad viral que no desaparecen.      Esta información no tiene kee fin reemplazar el consejo del médico. Asegúrese de hacerle al médico cualquier pregunta que tenga.      Document Revised: 07/07/2021 Document Reviewed: 07/07/2021    Elsevier Patient Education © 2022 [employer]

## 2022-11-22 NOTE — ED PROVIDER NOTE - NS ED ROS FT
Constitutional: (+) fever  Eyes/ENT: (-) visual changes   Cardiovascular: (-) chest pain, (-) syncope  Respiratory: (-) cough, (-) shortness of breath  Gastrointestinal: (+) vomiting, (+) diarrhea  Genitourinary: (-) dysuria, (-) hesitancy, (-) frequency   Musculoskeletal: (-) neck pain, (-) back pain, (-) joint pain  Integumentary: (-) rash, (-) edema  Neurological: (-) headache, (-) altered mental status, dizziness   Allergic/Immunologic: (-) pruritus

## 2022-11-22 NOTE — ED PROVIDER NOTE - OBJECTIVE STATEMENT
ID#[id number] 43 yo female with no pertinent pmh presents c/o fever/diarrhea/vomiting/body aches/dizziness/weakness for one day. pt describes her dizziness as rom spinning sensation and notes aggravation with positional changes. pt denies any other symptoms including atypical headache, recent illness/travel, cough, abdominal pain, chest pain, or SOB.     ID#[id number]

## 2022-12-07 NOTE — ED PROVIDER NOTE - NS ED ATTENDING STATEMENT MOD
07-Dec-2022
This was a shared visit with the IDALMIS. I reviewed and verified the documentation and independently performed the documented:

## 2023-01-25 ENCOUNTER — EMERGENCY (EMERGENCY)
Facility: HOSPITAL | Age: 43
LOS: 0 days | Discharge: HOME | End: 2023-01-25
Attending: EMERGENCY MEDICINE | Admitting: EMERGENCY MEDICINE
Payer: MEDICAID

## 2023-01-25 VITALS
SYSTOLIC BLOOD PRESSURE: 139 MMHG | DIASTOLIC BLOOD PRESSURE: 83 MMHG | TEMPERATURE: 99 F | HEART RATE: 100 BPM | RESPIRATION RATE: 20 BRPM | OXYGEN SATURATION: 99 %

## 2023-01-25 DIAGNOSIS — R51.9 HEADACHE, UNSPECIFIED: ICD-10-CM

## 2023-01-25 DIAGNOSIS — J02.9 ACUTE PHARYNGITIS, UNSPECIFIED: ICD-10-CM

## 2023-01-25 DIAGNOSIS — Z87.59 PERSONAL HISTORY OF OTHER COMPLICATIONS OF PREGNANCY, CHILDBIRTH AND THE PUERPERIUM: ICD-10-CM

## 2023-01-25 PROCEDURE — 99284 EMERGENCY DEPT VISIT MOD MDM: CPT

## 2023-01-25 RX ORDER — DEXAMETHASONE 0.5 MG/5ML
10 ELIXIR ORAL ONCE
Refills: 0 | Status: COMPLETED | OUTPATIENT
Start: 2023-01-25 | End: 2023-01-25

## 2023-01-25 RX ORDER — AMOXICILLIN 250 MG/5ML
500 SUSPENSION, RECONSTITUTED, ORAL (ML) ORAL ONCE
Refills: 0 | Status: COMPLETED | OUTPATIENT
Start: 2023-01-25 | End: 2023-01-25

## 2023-01-25 RX ORDER — AMOXICILLIN 250 MG/5ML
1 SUSPENSION, RECONSTITUTED, ORAL (ML) ORAL
Qty: 20 | Refills: 0
Start: 2023-01-25 | End: 2023-02-03

## 2023-01-25 RX ORDER — KETOROLAC TROMETHAMINE 30 MG/ML
15 SYRINGE (ML) INJECTION ONCE
Refills: 0 | Status: DISCONTINUED | OUTPATIENT
Start: 2023-01-25 | End: 2023-01-25

## 2023-01-25 RX ADMIN — Medication 500 MILLIGRAM(S): at 18:31

## 2023-01-25 RX ADMIN — Medication 15 MILLIGRAM(S): at 18:32

## 2023-01-25 RX ADMIN — Medication 10 MILLIGRAM(S): at 18:31

## 2023-01-25 NOTE — ED ADULT NURSE NOTE - CAS TRG GEN SKIN CONDITION
Health Maintenance Summary     Topic Due On Due Status Completed On    Immunization - Pneumococcal Apr 19, 2012 Overdue Apr 19, 2011    Medicare Wellness Visit Oct 23, 2016 Overdue Oct 23, 2015    IMMUNIZATION - DTaP/Tdap/Td Jan 20, 2025 Not Due Jan 20, 2015    Immunization-Influenza  Completed Sep 25, 2017    Depression Screening Aug 17, 1946 Overdue           Patient is due for topics as listed above, he wishes to proceed at this time, order (s) placed and patient given information .             Warm

## 2023-01-25 NOTE — ED PROVIDER NOTE - NS ED ATTENDING STATEMENT MOD
This was a shared visit with the IDALMIS. I reviewed and verified the documentation and independently performed the documented:

## 2023-01-25 NOTE — ED PROVIDER NOTE - NSFOLLOWUPCLINICS_GEN_ALL_ED_FT
[facility]  ENT  [address], 2nd [place] [address]  Phone: [phone]  Fax:   Follow Up Time: 7-10 Days

## 2023-01-25 NOTE — ED PROVIDER NOTE - PHYSICAL EXAMINATION
Physical Exam    Vital Signs: I have reviewed the initial vital signs.  Constitutional: well-nourished, appears stated age, no acute distress  Eyes: Conjunctiva pink, Sclera clear  ENT: Oropharynx is clear without lesions. uvula midline. (+) tonsillar erythema, edema, and white exudates. no stridor. no pta. floor of the mouth is soft without pus. no trismus. no drooling. no tripoding.   Cardiovascular: S1 and S2, regular rate, regular rhythm, well-perfused extremities, radial pulses equal and 2+ b/l.   Respiratory: unlabored respiratory effort, clear to auscultation bilaterally no wheezing, rales and rhonchi. pt is speaking full sentences. no accessory muscle use.   Musculoskeletal: FROM of b/l upper and lower extremities.   Integumentary: warm, dry, no rash  Neurologic: awake, alert, steady gait.   Psychiatric: appropriate mood, appropriate affect

## 2023-01-25 NOTE — ED PROVIDER NOTE - OBJECTIVE STATEMENT
[id number]. 41 y/o female with a PMH of gestational diabetes presents to the ED for evaluation of sore throat, and pain with swallowing x 3 days. pt reports taking tylenol without relief. pt reports she has not been eating because it is painful to swallow. pt reports headache. pt denies fever, cough, chest pain, sob, abdominal pain, n/v/d/c, recent travel, recent sick contacts, drooling, or weakness.

## 2023-01-25 NOTE — ED PROVIDER NOTE - CLINICAL SUMMARY MEDICAL DECISION MAKING FREE TEXT BOX
42-year-old female with no significant PMHx, in ER with c/o of sore throat and pain with swallowing for the past 3 days.  Patient able to tolerate p.o., but is painful.  Denies any difficulty with breathing.  No F/C.  No CP/SOB.  No cough.  No abdominal pain.  No N/V/D.  + Intermittent headache.  PE - nad, nc/at, eomi, perrl, op - clear, mmm, + b/l pharyngeal erythema and exudates, uvula midline and normal sized, no stridor, neck supple, cta b/l, no w/r/r, rrr, abd- soft, nt/nd, nabs, from x 4, no LE swelling/tenderness, A&O x 3, no focal deficits.  -pt given Toradol, Decadron, ABX in ER.  To DC home with Rx for PO antibiotics.  Patient aware she should return to ER if she feels worse, for increased pain, difficulty swallowing or breathing, or any other new/concerning symptoms.  Patient understands agrees with plan.  *H&P obtained with Chinese translation by SERGEI #[id number]*

## 2023-01-25 NOTE — ED PROVIDER NOTE - PATIENT PORTAL LINK FT
You can access the FollowMyHealth Patient Portal offered by [facility] by registering at the following website: [email or web address]. By joining [employer]’s FollowMyHealth portal, you will also be able to view your health information using other applications (apps) compatible with our system.

## 2023-01-25 NOTE — ED PROVIDER NOTE - ATTENDING APP SHARED VISIT CONTRIBUTION OF CARE
42-year-old female with no significant PMHx, in ER with c/o of sore throat and pain with swallowing for the past 3 days.  Patient able to tolerate p.o., but is painful.  Denies any difficulty with breathing.  No F/C.  No CP/SOB.  No cough.  No abdominal pain.  No N/V/D.  + Intermittent headache.  PE - nad, nc/at, eomi, perrl, op - clear, mmm, + b/l pharyngeal erythema and exudates, uvula midline and normal sized, no stridor, neck supple, cta b/l, no w/r/r, rrr, abd- soft, nt/nd, nabs, from x 4, no LE swelling/tenderness, A&O x 3, no focal deficits.  -pt given Toradol, Decadron, ABX in ER.  To DC home with Rx for PO antibiotics.  Patient aware she should return to ER if she feels worse, for increased pain, difficulty swallowing or breathing, or any other new/concerning symptoms.  Patient understands agrees with plan.  *H&P obtained with Khmer translation by SERGEI #[id number]* see mdm

## 2023-01-25 NOTE — ED PROVIDER NOTE - NSFOLLOWUPINSTRUCTIONS_ED_ALL_ED_FT
Faringitis    Pharyngitis    Upper body outline including the pharynx.   La faringitis es inflamación de la garganta (faringe). Es alyssa causa muy común de dolor de garganta. La faringitis puede ser causada por alyssa bacteria, berenice por lo general la provoca un virus. La mayoría de los casos de faringitis se curan sin tratamiento.      ¿Cuáles son las causas?    Esta afección puede ser causada por lo siguiente:  •Infección por virus (viral). La faringitis viral se contagia fácilmente de alyssa persona a otra (es contagiosa) al toser, estornudar y compartir objetos o utensilios personales kee tazas, tenedores, cucharas, cepillos de dientes.      •Infección por bacterias (bacteriana). La faringitis bacteriana se puede contagiar al tocarse la nariz o marina luego de entrar en contacto con las bacterias, o a través de un contacto cercano, kee por ejemplo, al besarse.      •Alergias. Las alergias pueden causar alyssa acumulación de mucosidad en la garganta (goteo posnasal) que deriva en la inflamación e irritación. A hobbs vez, las alergias pueden bloquear las fosas nasales, lo cual hace que se deba respirar por la boca, y esto seca e irrita la garganta.        ¿Qué incrementa el riesgo?    Es más probable que contraiga esta afección si:  •Tiene entre 5 y 24 años.      •Está en lugares muy concurridos, tales kee guardería, escuela o vivir en alyssa residencia estudiantil.      •Vive en un ambiente de clima frío.      •Tiene debilitado el sistema que combate las enfermedades (inmunitario).        ¿Cuáles son los signos o síntomas?    Los síntomas de esta afección varían según la causa. Los síntomas frecuentes de esta afección incluyen los siguientes:  •Dolor de garganta.      •Fatiga.      •Fiebre no muy carisa.      •Nariz tapada (congestión nasal) y tos.      •Dolor de sole.      Otros síntomas pueden incluir lo siguiente:  •Ganglios en el keisha (ganglios linfáticos) que están hinchados.      •Erupciones cutáneas.      •Película parecida a las placas en la garganta o amígdalas. Por lo general, esto es un síntoma de faringitis bacteriana.      •Vómitos.      •Ojos rojos con picazón (conjuntivitis).      •Pérdida del apetito.      •Kelsea musculares y en las articulaciones.      •Amígdalas agrandadas.        ¿Cómo se diagnostica?    Esta afección se puede diagnosticar en función de los antecedentes médicos y un examen físico. El médico le hará preguntas sobre la enfermedad y jaz síntomas.    Puede que se stephanie un cultivo de hobbs garganta para buscar bacterias (prueba rápida para estreptococos). También es posible que se realicen otros análisis de laboratorio, según la posible causa, aunque esto es poco común.      ¿Cómo se trata?    Muchas veces, no se requiere tratamiento para esta afección. La faringitis generalmente mejora en 3 o 4 días sin tratamiento.    La faringitis bacteriana puede tratarse con antibióticos.      Siga estas instrucciones en hobbs casa:    Medicamentos     •Use los medicamentos de venta tate y los recetados solamente kee se lo haya indicado el médico.      •Si le recetaron un antibiótico, tómelo kee se lo haya indicado el médico. No deje de kristi el antibiótico, aunque comience a sentirse mejor.      •Use aerosoles para aliviar la garganta kee se lo haya indicado el médico.      •Los niños pueden contraer faringitis. No le dé aspirina al annabelle por el riesgo de que contraiga el síndrome de Reye.        Control del dolor   A cup of hot tea.   Para ayudar a aliviar el dolor, intente lo siguiente:  •Iram líquidos calientes, kee caldos, infusiones o Rincon.      •También puede comer o beber líquidos fríos o congelados, tales kee paletas de hielo congelado.      •Stephanie gárgaras con alyssa mezcla de agua y sal 3 o 4 veces al día, o cuando sea necesario. Para preparar agua con sal, disuelva totalmente de ½ a 1 cucharadita (de 3 a 6 g) de sal en 1 taza (237 ml) de agua tibia.      •Chupe caramelos duros o pastillas para la garganta.      •Ponga un humidificador de vapor frío en la habitación por la noche para humedecer el aire.      •También puede abrir el Rincon de la ducha y sentarse en el baño con la nawaf cerrada estevan 5 a 10 minutos.        Instrucciones generales   A do not smoke cigarettes sign.   • No consuma ningún producto que contenga nicotina o tabaco. Estos productos incluyen cigarrillos, tabaco para mascar y aparatos de vapeo, kee los cigarrillos electrónicos. Si necesita ayuda para dejar de fumar, consulte al médico.      •Stephanie reposo kee se lo haya indicado el médico.      •Beber suficiente líquido kee para mantener la orina de color amarillo pálido.        ¿Cómo se jannette?    Para ayudar a evitar infectarse o que se propague la infección:  •Lávese las chuck frecuentemente con agua y jabón estevan al menos 20 segundos. Use desinfectante para chuck si no dispone de agua y jabón.      •No se toque los ojos, la nariz o la boca sin haberse lavado las chuck, y lávese las chuck después de tocarse estas zonas.      •No comparta vasos ni utensilios para comer.      •Evite el contacto cercano con personas que estén enfermas.        Comuníquese con un médico si:    •Tiene bultos grandes y dolorosos en el keisha.      •Tiene alyssa erupción cutánea.      •Tose con mucosidad de color janneth, amarillo amarronado o con linda.        Solicite ayuda de inmediato si:    •El keisha se pone rígido.      •Comienza a babear o no puede tragar líquidos.      •No puede beber ni kristi medicamentos sin vomitar.      •Siente un dolor intenso que no se va, incluso luego de kristi los medicamentos.      •Tiene dificultades para respirar, y no a causa de la congestión nasal.      •Experimenta un nuevo dolor e hinchazón de las articulaciones kee las rodillas, tobillos, muñecas o codos.      Estos síntomas pueden representar un problema grave que constituye alyssa emergencia. No espere a kailey si los síntomas desaparecen. Solicite atención médica de inmediato. Comuníquese con el servicio de emergencias de hobbs localidad (911 en los [place]). No conduzca por jaz propios medios hasta el hospital.       Resumen    •La faringitis ocurre cuando hay enrojecimiento, dolor e hinchazón (inflamación) en la garganta (faringe).      •Si kenneth la faringitis puede ser causada por alyssa bacteria, la causa más común son los virus.      •La mayoría de los casos de faringitis se curan sin tratamiento.      •La faringitis bacteriana se trata con antibióticos.      Esta información no tiene kee fin reemplazar el consejo del médico. Asegúrese de hacerle al médico cualquier pregunta que tenga.

## 2023-01-31 ENCOUNTER — APPOINTMENT (OUTPATIENT)
Dept: OTOLARYNGOLOGY | Facility: CLINIC | Age: 43
End: 2023-01-31
Payer: MEDICAID

## 2023-01-31 VITALS — BODY MASS INDEX: 28.22 KG/M2 | WEIGHT: 140 LBS | HEIGHT: 59 IN

## 2023-01-31 DIAGNOSIS — J06.9 ACUTE UPPER RESPIRATORY INFECTION, UNSPECIFIED: ICD-10-CM

## 2023-01-31 PROCEDURE — 99203 OFFICE O/P NEW LOW 30 MIN: CPT

## 2023-01-31 NOTE — REASON FOR VISIT
[Initial Evaluation] : an initial evaluation for [FreeTextEntry2] : trouble swallowing , throat pain

## 2023-01-31 NOTE — HISTORY OF PRESENT ILLNESS
[de-identified] : Lao id# [id number]\par Patient presents today c/o trouble swallowing , throat pain .  Patient  was in the ER on Wednesday for throat pain .  Patient was given steroid injection and course of oral antibiotics. Her symptoms are improving. No voice changes, trismus, or trouble breathing. Swallow is now normal. Overall feeling better. First episode of such symptoms.

## 2023-02-07 NOTE — ED ADULT NURSE NOTE - CINV DISCH TEACH PARTICIP
Patient/Spouse Itraconazole Counseling:  I discussed with the patient the risks of itraconazole including but not limited to liver damage, nausea/vomiting, neuropathy, and severe allergy.  The patient understands that this medication is best absorbed when taken with acidic beverages such as non-diet cola or ginger ale.  The patient understands that monitoring is required including baseline LFTs and repeat LFTs at intervals.  The patient understands that they are to contact us or the primary physician if concerning signs are noted.

## 2023-03-02 NOTE — OB RN PATIENT PROFILE - NS_PRENATALCARE_OBGYN_ALL_OB
Please be informed that if you contact our office outside of normal business hours the physician on call cannot help with any scheduling or rescheduling issues, procedure instruction questions or any type of medication issue. We advise you for any urgent/emergency that you go to the nearest emergency room! PLEASE CALL OUR OFFICE DURING NORMAL BUSINESS HOURS    Monday - Friday   8 am to 5 pm    [facility] [address]: [phone]    Josephine:  [phone]  **It is YOUR responsibilty to bring medication bottles and/or updated medication list to [address] [place] [address]. This will allow us to better serve you and all your healthcare needs**  [facility] Locations - No appointment necessary. Sites open Monday to Friday. Call your preferred location for test preparation, business   hours and other information you need. [employer]. [address] [place], [address]  Phone: [phone] [place]  [address] [facility]  [address] [place], [address] [facility] [place]  Phone: [phone]     Thank you for allowing us to care for you today! We want to ensure we can follow your treatment plan and we strive to give you the best outcomes and experience possible. If you ever have a life threatening emergency and call 911 - for an ambulance (EMS)   Our providers can only care for you at:   [facility] or [facility]. Even if you have someone take you or you drive yourself we can only care for you in a [facility] facility. Our providers are not setup at the other healthcare locations!
Yes

## 2025-01-31 ENCOUNTER — EMERGENCY (EMERGENCY)
Facility: HOSPITAL | Age: 45
LOS: 0 days | Discharge: HOME | End: 2025-01-31
Attending: EMERGENCY MEDICINE
Payer: SELF-PAY

## 2025-01-31 VITALS
SYSTOLIC BLOOD PRESSURE: 119 MMHG | TEMPERATURE: 99 F | OXYGEN SATURATION: 98 % | RESPIRATION RATE: 19 BRPM | DIASTOLIC BLOOD PRESSURE: 80 MMHG | HEART RATE: 90 BPM

## 2025-01-31 VITALS
HEART RATE: 120 BPM | WEIGHT: 130.07 LBS | HEIGHT: 60 IN | DIASTOLIC BLOOD PRESSURE: 87 MMHG | TEMPERATURE: 98 F | RESPIRATION RATE: 20 BRPM | SYSTOLIC BLOOD PRESSURE: 137 MMHG | OXYGEN SATURATION: 97 %

## 2025-01-31 DIAGNOSIS — R50.9 FEVER, UNSPECIFIED: ICD-10-CM

## 2025-01-31 DIAGNOSIS — N64.51 INDURATION OF BREAST: ICD-10-CM

## 2025-01-31 DIAGNOSIS — R00.0 TACHYCARDIA, UNSPECIFIED: ICD-10-CM

## 2025-01-31 PROCEDURE — 93010 ELECTROCARDIOGRAM REPORT: CPT

## 2025-01-31 PROCEDURE — 76882 US LMTD JT/FCL EVL NVASC XTR: CPT | Mod: 26,RT

## 2025-01-31 PROCEDURE — 76882 US LMTD JT/FCL EVL NVASC XTR: CPT | Mod: 50

## 2025-01-31 PROCEDURE — 99284 EMERGENCY DEPT VISIT MOD MDM: CPT

## 2025-01-31 PROCEDURE — 99284 EMERGENCY DEPT VISIT MOD MDM: CPT | Mod: 25

## 2025-01-31 PROCEDURE — 93005 ELECTROCARDIOGRAM TRACING: CPT

## 2025-01-31 RX ORDER — AMOXICILLIN AND CLAVULANATE POTASSIUM 500; 125 MG/1; MG/1
875 TABLET, FILM COATED ORAL
Qty: 20 | Refills: 0
Start: 2025-01-31 | End: 2025-06-21

## 2025-01-31 RX ORDER — AMOXICILLIN/POTASSIUM CLAV 875-125 MG
875 TABLET ORAL
Qty: 20 | Refills: 0
Start: 2025-01-31 | End: 2025-02-09

## 2025-01-31 RX ORDER — ACETAMINOPHEN 80 MG/.8ML
650 SOLUTION/ DROPS ORAL ONCE
Refills: 0 | Status: COMPLETED | OUTPATIENT
Start: 2025-01-31 | End: 2025-01-31

## 2025-01-31 RX ORDER — IBUPROFEN 200 MG
600 TABLET ORAL ONCE
Refills: 0 | Status: COMPLETED | OUTPATIENT
Start: 2025-01-31 | End: 2025-01-31

## 2025-01-31 RX ADMIN — Medication 600 MILLIGRAM(S): at 15:37

## 2025-01-31 RX ADMIN — ACETAMINOPHEN 650 MILLIGRAM(S): 80 SOLUTION/ DROPS ORAL at 15:36

## 2025-01-31 NOTE — ED PROVIDER NOTE - PATIENT PORTAL LINK FT
You can access the FollowMyHealth Patient Portal offered by [facility] by registering at the following website: [email or web address]. By joining [email or web address]’s FollowMyHealth portal, you will also be able to view your health information using other applications (apps) compatible with our system.

## 2025-01-31 NOTE — ED PROVIDER NOTE - OBJECTIVE STATEMENT
44 year old female no reported history presenting for right sided breast pain x3 days. Patient reports the pain is worse with palpation and movement. Endorses fevers since last night. Patient denies breastfeeding. Denies any discharge from nipple or any wound. Denies trauma to the area

## 2025-01-31 NOTE — ED PROVIDER NOTE - NSFOLLOWUPINSTRUCTIONS_ED_ALL_ED_FT
Nuestros coordinadores de referencias del Departamento de Emergencias se comunicarán con usted en las próximas 24 a 48 horas, de 9:00 a. m. a 5:00 p. m., con alyssa radha de seguimiento. Espere alyssa llamada telefónica del hospital en gianfranco período de tiempo. Si no recibe alyssa llamada o si tiene alguna pregunta o inquietud, puede comunicarse con ellos al [phone].     siga con hobbs médico de cabecera en 1-3 linn      Dolor a la palpación de las mamas  Breast Tenderness  El dolor a la palpación de las mamas es un problema frecuente en las mujeres de todas las edades, berenice también puede ocurrir en los hombres. Son muchas las causas posibles del dolor a la palpación de las mamas, incluidos los cambios hormonales, infecciones, el uso de ciertos medicamentos y el consumo de cafeína. En las mujeres, el dolor generalmente es intermitente y se relaciona con el ciclo menstrual, berenice puede ser selvin. El dolor a la palpación de las mamas puede oscilar desde molestias leves hasta un dolor intenso.  El dolor en los senos también puede estar relacionado con infecciones, abscesos o acumulación de líquido.    Puede someterse a pruebas kee alyssa mamografía o alyssa ecografía, para detectar hallazgos inusuales. Por lo general, el dolor a la palpación de las mamas no significa que usted tenga cáncer de mama.    Siga estas instrucciones en hobbs casa:  Control del dolor y de las molestias    Bag of ice on a towel on the skin.   Si se lo indican, aplique hielo sobre la jarek dolorida. Para hacer esto:  Ponga el hielo en alyssa bolsa plástica.  Coloque alyssa toalla entre la piel y la bolsa.  Aplique el hielo estevan 20 minutos, 2 a 3 veces por día.  Si la piel se le pone de color stokes brillante, retire el hielo de inmediato para evitar daños en la piel. El riesgo de daño en la piel es mayor si no puede sentir dolor, calor o frío.  Use un sostén de soporte o sujetador para el pecho:  Estevan la actividad física.  Mientras duerme, si tiene las mamas muy sensibles.  Medicamentos    Use los medicamentos de venta tate y los recetados solamente kee se lo haya indicado el médico. Si el dolor es causado por alyssa infección, posiblemente le receten un antibiótico.  Si le recetaron antibiótico, tómelo kee se lo haya indicado el médico. No deje de usar el antibiótico aunque comience a sentirse mejor.  Comida y bebida    Disminuya la cantidad de cafeína de hobbs dieta. En cambio, cesar más agua y elija bebidas sin cafeína.  El médico puede recomendarle disminuir el consumo de grasa en hobbs dieta. Puede hacer lo siguiente:  Limite el consumo de alimentos fritos.  A la hora de cocinarlos, opte por hornearlos, hervirlos, grillarlos y asarlos a la gwen.  Instrucciones generales    A person writing in a journal.  Lleve un registro de los linn y las horas cuando tiene mayor sensibilidad en las mamas.  Pregúntele al médico cómo debe hacerse los exámenes de mamas en hobbs casa. [place] la ayudará a notar si tiene algún crecimiento o bulto fuera de lo normal.  Concurra a todas las visitas de seguimiento.  Comuníquese con un médico si:  Cualquier jarek de la mama está dura, enrojecida y caliente al tacto. Puede ser un signo de infección.  Es og y tiene un bulto nuevo o doloroso en la mama que no desaparece después de la finalización del período menstrual.  Si no está en etapa de amamantamiento y le sale líquido de los pezones, especialmente linda o pus.  Tiene fiebre.  El dolor no mejora o empeora.  El dolor le impide realizar las actividades cotidianas.  Resumen  El dolor a la palpación de las mamas puede oscilar desde molestias leves hasta un dolor intenso.  Son muchas las causas posibles del dolor a la palpación de las mamas, incluidos los cambios hormonales, infecciones, el uso de ciertos medicamentos y el consumo de cafeína.  Puede tratarse con hielo, el uso un sostén de soporte o sujetador para el pecho, y medicamentos.  Stephanie cambios en la dieta kee se lo haya indicado el médico.  Esta información no tiene kee fin reemplazar el consejo del médico. Asegúrese de hacerle al médico cualquier pregunta que tenga.

## 2025-01-31 NOTE — ED PROVIDER NOTE - ATTENDING APP SHARED VISIT CONTRIBUTION OF CARE
43 yo f with no reported pmh presents with 3 days of R breast pain and swelling.  admits redness and warmth. denies trauma to area.  pt is not breastfeeding.  last mammogram was 1.5 yrs ago, was normal.  pt denies nipple discharge.  pt admits subjective fever last night.  did not take any medications.  exam: L breast normal, R breast with indurated, erythematous patch at the LUQ, ttp warm to touch imp: pt with possible breast abscess with cellulitis, will start pt on oral abx and advised tylenol/motrin for pain.  pt is advised to f/u with breast clinic for further eval/imaging/mgt.  will place in referral program.

## 2025-01-31 NOTE — ED PROVIDER NOTE - CLINICAL SUMMARY MEDICAL DECISION MAKING FREE TEXT BOX
pt with possible breast abscess with cellulitis, will start pt on oral abx and advised tylenol/motrin for pain.  pt is advised to f/u with breast clinic for further eval/imaging/mgt.  will place in referral program.

## 2025-01-31 NOTE — ED ADULT NURSE NOTE - NSFALLUNIVINTERV_ED_ALL_ED
Bed/Stretcher in lowest position, wheels locked, appropriate side rails in place/Call bell, personal items and telephone in reach/Instruct patient to call for assistance before getting out of bed/chair/stretcher/Non-slip footwear applied when patient is off stretcher/Dike to call system/Physically safe environment - no spills, clutter or unnecessary equipment/Purposeful proactive rounding/Room/bathroom lighting operational, light cord in reach

## 2025-01-31 NOTE — ED PROVIDER NOTE - PHYSICAL EXAMINATION
VITAL SIGNS: I have reviewed nursing notes and confirm.  CONSTITUTIONAL: Well-developed; well-nourished; in no acute distress.  SKIN: No skin discoloration or erythema no skin warmth   HEAD: Normocephalic; atraumatic.  NECK: Supple  CARD: tachycardic. regular rhythm   RESP: Speaking in full sentences.   BREAST: Moderate area of induration on right upper breast. no discharge. Performed by TAHIRA Castro, chaperoned by TAHIRA Skinner   EXT: Normal ROM  NEURO: Alert, oriented  PSYCH: Cooperative, appropriate.

## 2025-02-18 ENCOUNTER — APPOINTMENT (OUTPATIENT)
Dept: BREAST CENTER | Facility: CLINIC | Age: 45
End: 2025-02-18
Payer: COMMERCIAL

## 2025-02-18 VITALS
HEIGHT: 59 IN | DIASTOLIC BLOOD PRESSURE: 70 MMHG | WEIGHT: 140 LBS | SYSTOLIC BLOOD PRESSURE: 125 MMHG | BODY MASS INDEX: 28.22 KG/M2

## 2025-02-18 DIAGNOSIS — N63.10 UNSPECIFIED LUMP IN THE RIGHT BREAST, UNSPECIFIED QUADRANT: ICD-10-CM

## 2025-02-18 DIAGNOSIS — N61.0 MASTITIS WITHOUT ABSCESS: ICD-10-CM

## 2025-02-18 PROCEDURE — 99203 OFFICE O/P NEW LOW 30 MIN: CPT

## 2025-02-18 RX ORDER — DOXYCYCLINE HYCLATE 100 MG/1
100 CAPSULE ORAL
Qty: 20 | Refills: 0 | Status: ACTIVE | COMMUNITY
Start: 2025-02-18 | End: 1900-01-01

## 2025-02-24 NOTE — ED ADULT NURSE NOTE - NS ED NOTE  TALK SOMEONE YN
Caller: Delonte Mario Jr    Doctor: Dr. Weaver    
No

## 2025-03-04 ENCOUNTER — OUTPATIENT (OUTPATIENT)
Dept: OUTPATIENT SERVICES | Facility: HOSPITAL | Age: 45
LOS: 1 days | End: 2025-03-04
Payer: COMMERCIAL

## 2025-03-04 ENCOUNTER — RESULT REVIEW (OUTPATIENT)
Age: 45
End: 2025-03-04

## 2025-03-04 DIAGNOSIS — N63.10 UNSPECIFIED LUMP IN THE RIGHT BREAST, UNSPECIFIED QUADRANT: ICD-10-CM

## 2025-03-04 PROCEDURE — 77066 DX MAMMO INCL CAD BI: CPT

## 2025-03-04 PROCEDURE — 76641 ULTRASOUND BREAST COMPLETE: CPT | Mod: 50

## 2025-03-04 PROCEDURE — 77066 DX MAMMO INCL CAD BI: CPT | Mod: 26

## 2025-03-04 PROCEDURE — G0279: CPT | Mod: 26

## 2025-03-04 PROCEDURE — 76641 ULTRASOUND BREAST COMPLETE: CPT | Mod: 26,50

## 2025-03-04 PROCEDURE — G0279: CPT

## 2025-03-05 DIAGNOSIS — N63.10 UNSPECIFIED LUMP IN THE RIGHT BREAST, UNSPECIFIED QUADRANT: ICD-10-CM

## 2025-03-05 DIAGNOSIS — R92.8 OTHER ABNORMAL AND INCONCLUSIVE FINDINGS ON DIAGNOSTIC IMAGING OF BREAST: ICD-10-CM

## 2025-03-18 ENCOUNTER — RESULT REVIEW (OUTPATIENT)
Age: 45
End: 2025-03-18

## 2025-03-18 ENCOUNTER — APPOINTMENT (OUTPATIENT)
Age: 45
End: 2025-03-18
Payer: COMMERCIAL

## 2025-03-18 ENCOUNTER — APPOINTMENT (OUTPATIENT)
Dept: INTERNAL MEDICINE | Facility: CLINIC | Age: 45
End: 2025-03-18

## 2025-03-18 ENCOUNTER — OUTPATIENT (OUTPATIENT)
Dept: OUTPATIENT SERVICES | Facility: HOSPITAL | Age: 45
LOS: 1 days | End: 2025-03-18
Payer: COMMERCIAL

## 2025-03-18 DIAGNOSIS — R92.8 OTHER ABNORMAL AND INCONCLUSIVE FINDINGS ON DIAGNOSTIC IMAGING OF BREAST: ICD-10-CM

## 2025-03-18 PROCEDURE — 88312 SPECIAL STAINS GROUP 1: CPT | Mod: 26

## 2025-03-18 PROCEDURE — 88305 TISSUE EXAM BY PATHOLOGIST: CPT | Mod: 26

## 2025-03-18 PROCEDURE — 88365 INSITU HYBRIDIZATION (FISH): CPT

## 2025-03-18 PROCEDURE — 77065 DX MAMMO INCL CAD UNI: CPT | Mod: 26,RT

## 2025-03-18 PROCEDURE — 88312 SPECIAL STAINS GROUP 1: CPT

## 2025-03-18 PROCEDURE — 88364 INSITU HYBRIDIZATION (FISH): CPT | Mod: 26

## 2025-03-18 PROCEDURE — 19084 BX BREAST ADD LESION US IMAG: CPT | Mod: RT,59

## 2025-03-18 PROCEDURE — 88365 INSITU HYBRIDIZATION (FISH): CPT | Mod: 26

## 2025-03-18 PROCEDURE — 88341 IMHCHEM/IMCYTCHM EA ADD ANTB: CPT

## 2025-03-18 PROCEDURE — 19084 BX BREAST ADD LESION US IMAG: CPT | Mod: RT

## 2025-03-18 PROCEDURE — 19083 BX BREAST 1ST LESION US IMAG: CPT | Mod: RT

## 2025-03-18 PROCEDURE — 77065 DX MAMMO INCL CAD UNI: CPT | Mod: RT

## 2025-03-18 PROCEDURE — 88341 IMHCHEM/IMCYTCHM EA ADD ANTB: CPT | Mod: 26

## 2025-03-18 PROCEDURE — 88342 IMHCHEM/IMCYTCHM 1ST ANTB: CPT

## 2025-03-18 PROCEDURE — 88342 IMHCHEM/IMCYTCHM 1ST ANTB: CPT | Mod: 26

## 2025-03-18 PROCEDURE — 88364 INSITU HYBRIDIZATION (FISH): CPT

## 2025-03-18 PROCEDURE — A4648: CPT

## 2025-03-18 PROCEDURE — 88360 TUMOR IMMUNOHISTOCHEM/MANUAL: CPT

## 2025-03-18 PROCEDURE — 88305 TISSUE EXAM BY PATHOLOGIST: CPT

## 2025-03-27 ENCOUNTER — APPOINTMENT (OUTPATIENT)
Dept: BREAST CENTER | Facility: CLINIC | Age: 45
End: 2025-03-27
Payer: COMMERCIAL

## 2025-03-27 DIAGNOSIS — R59.9 ENLARGED LYMPH NODES, UNSPECIFIED: ICD-10-CM

## 2025-03-27 DIAGNOSIS — N61.21 GRANULOMATOUS MASTITIS, RIGHT BREAST: ICD-10-CM

## 2025-03-27 DIAGNOSIS — R92.30 DENSE BREASTS, UNSPECIFIED: ICD-10-CM

## 2025-03-27 DIAGNOSIS — R92.8 OTHER ABNORMAL AND INCONCLUSIVE FINDINGS ON DIAGNOSTIC IMAGING OF BREAST: ICD-10-CM

## 2025-03-27 PROCEDURE — 99214 OFFICE O/P EST MOD 30 MIN: CPT

## 2025-03-27 RX ORDER — METHYLPREDNISOLONE 4 MG/1
4 TABLET ORAL
Qty: 1 | Refills: 0 | Status: ACTIVE | COMMUNITY
Start: 2025-03-27 | End: 1900-01-01

## 2025-04-07 NOTE — OB PROVIDER H&P - NS_PROVCHECK_OBGYN_ALL_OB
Patient was informed of the reason for this intervention.
Spine appears normal, range of motion is not limited, no muscle or joint tenderness

## 2025-05-19 ENCOUNTER — APPOINTMENT (OUTPATIENT)
Dept: SURGERY | Facility: CLINIC | Age: 45
End: 2025-05-19
Payer: COMMERCIAL

## 2025-05-19 VITALS
OXYGEN SATURATION: 98 % | HEART RATE: 100 BPM | DIASTOLIC BLOOD PRESSURE: 80 MMHG | WEIGHT: 144 LBS | SYSTOLIC BLOOD PRESSURE: 120 MMHG | HEIGHT: 59 IN | BODY MASS INDEX: 29.03 KG/M2

## 2025-05-19 PROCEDURE — 99203 OFFICE O/P NEW LOW 30 MIN: CPT

## 2025-05-19 RX ORDER — PREDNISONE 10 MG/1
10 TABLET ORAL AS DIRECTED
Qty: 86 | Refills: 0 | Status: ACTIVE | COMMUNITY
Start: 2025-05-19 | End: 1900-01-01

## 2025-06-12 ENCOUNTER — EMERGENCY (EMERGENCY)
Facility: HOSPITAL | Age: 45
LOS: 0 days | Discharge: HOME | End: 2025-06-12
Attending: EMERGENCY MEDICINE

## 2025-06-12 VITALS
HEIGHT: 59 IN | WEIGHT: 147.05 LBS | RESPIRATION RATE: 18 BRPM | DIASTOLIC BLOOD PRESSURE: 99 MMHG | OXYGEN SATURATION: 97 % | TEMPERATURE: 98 F | HEART RATE: 101 BPM | SYSTOLIC BLOOD PRESSURE: 165 MMHG

## 2025-06-12 PROCEDURE — 99283 EMERGENCY DEPT VISIT LOW MDM: CPT

## 2025-06-12 RX ORDER — IBUPROFEN 200 MG
600 TABLET ORAL ONCE
Refills: 0 | Status: COMPLETED | OUTPATIENT
Start: 2025-06-12 | End: 2025-06-12

## 2025-06-12 RX ADMIN — Medication 600 MILLIGRAM(S): at 21:02

## 2025-06-12 NOTE — ED ADULT TRIAGE NOTE - CHIEF COMPLAINT QUOTE
Pt c/o pain to right breast associated with redness x5 months. Had ultrasound biopsy done 3/25 was told had cyst.

## 2025-06-12 NOTE — ED PROVIDER NOTE - CLINICAL SUMMARY MEDICAL DECISION MAKING FREE TEXT BOX
Patient with suspicion for mastitis, will start on oral antibiotics and follow-up with breast clinic.  Pt was given strict return to ED precautions and pt indicated that he/she understood.

## 2025-06-12 NOTE — ED PROVIDER NOTE - OBJECTIVE STATEMENT
43 YO F with signifivant PMH of chronic right breast pain and masses, previously seen and worked up by Dr. Koo. 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 She was treated with a short course of steroids and referred to rheumatology but she never saw them. She reports that over the past several days she has had increasing pain in the right breast again, worse with movement. Pt denies any skin changes or nipple discharge.

## 2025-06-12 NOTE — ED ADULT NURSE NOTE - NSFALLUNIVINTERV_ED_ALL_ED
Bed/Stretcher in lowest position, wheels locked, appropriate side rails in place/Call bell, personal items and telephone in reach/Instruct patient to call for assistance before getting out of bed/chair/stretcher/Non-slip footwear applied when patient is off stretcher/[place] to call system/Physically safe environment - no spills, clutter or unnecessary equipment/Purposeful proactive rounding/Room/bathroom lighting operational, light cord in reach

## 2025-06-12 NOTE — ED PROVIDER NOTE - NSFOLLOWUPINSTRUCTIONS_ED_ALL_ED_FT
Our Emergency Department Referral Coordinators will be reaching out to you in the next 24-48 hours from 9:00am to 5:00pm to schedule a follow up appointment. Please expect a phone call from the hospital in that time frame. If you do not receive a call or if you have any questions or concerns, you can reach them at   [phone]     Mastitis- PLEASE TAKE THE MEDICATIONS SENT TO YOUR PHARMACY AS PRESCRIBED AND FOLLOW UP WITH THE BREAST CLINIC    Front view of a breast showing a tender area of inflammation.  Mastitis is irritation and swelling (inflammation) in an area of the breast. This most often happens in females who are breastfeeding, but it can happen to anyone. This includes males. It will sometimes go away on its own. Other times, mastitis may need treatment.    What are the causes?  In people who are not breastfeeding, mastitis is often caused by germs (bacteria) that get into breast tissue. This can happen through cuts, cracks, or openings in the skin.    Other causes include:  Nipple piercing.  Some types of breast cancer.  What are the signs or symptoms?  Swelling, redness, tenderness, and pain in the breast. The area may also feel warm.  Swelling of the glands under the arm.  Fluid coming from the nipple.  Feeling tired (fatigue).  Headache and body aches.  Fever and chills.  Vomiting or feeling like you may vomit (nauseous).  Symptoms can last 2–5 days. The pain and redness are the worst on days 2 and 3. This will often go away by day 5. If an infection develops and is not treated, pus or fluid may form under the skin (abscess).    How is this diagnosed?  Mastitis is often diagnosed based on a physical exam and your symptoms. You may also have other tests, such as:  Blood tests.  Fluid tests. If fluid collects under the skin, it may be tested to find if germs are present.  Breast X-rays or ultrasounds.  How is this treated?  Treatment may include:  Using hot or cold compresses.  Pain medicine.  Antibiotic or steroid medicine.  Rest.  Drinking plenty of fluids.  Removing pus or fluid from under the skin.  Follow these instructions at home:  Breast care    A person with a cold compress on the side of the breast.  Keep your nipples clean and dry.  If told, put ice on the affected area.  Put ice in a plastic bag.  Place a towel between your skin and the bag.  Leave the ice on for 20 minutes, 2–3 times a day.  If told, put heat on the affected area. Do this as often as told by your doctor. Use the heat source that your doctor recommends, such as a moist heat pack or heating pad.  Place a towel between your skin and the heat source.  Leave the heat on for 20–30 minutes.  If your skin turns bright red, take off the ice or heat right away to prevent skin damage. The risk of damage is higher if you cannot feel pain, heat, or cold.  Medicines    Take over-the-counter and prescription medicines only as told by your doctor.  If you were prescribed antibiotics, take them as told by your doctor. Do not stop using them even if you start to feel better.  Contact a doctor if:  You have pus-like fluid leaking from the breast.  You have a fever.  Your pain and swelling get worse.  Your symptoms do not start to get better within 2 days of starting treatment.  Your pain is not helped by medicine.  Get help right away if:  You have a red line going from your breast toward your armpit.

## 2025-06-12 NOTE — ED PROVIDER NOTE - ATTENDING CONTRIBUTION TO CARE
44-year-old female with past history of chronic mastitis, breast masses, has been followed up by breast clinic status post biopsy several months ago, presents with right breast pain and swelling.  Patient says has been going on for the last few days, worsening.  Patient has subjective fever and chills.  Patient denies any trauma to the breast.  Patient has not followed up with the breast clinic recently.  Exam: Left breast normal, right breast with induration and erythema at the superior part of the breast adjacent to the nipple, no nipple discharge, impression: Patient with suspicion for mastitis, will start on oral antibiotics and follow-up with breast clinic.  Pt was given strict return to ED precautions and pt indicated that he/she understood.

## 2025-06-12 NOTE — ED PROVIDER NOTE - PATIENT PORTAL LINK FT
You can access the FollowMyHealth Patient Portal offered by [facility] by registering at the following website: [email or web address]. By joining [employer]’s FollowMyHealth portal, you will also be able to view your health information using other applications (apps) compatible with our system.
Four or more times a week

## 2025-06-13 NOTE — CHART NOTE - NSCHARTNOTEFT_GEN_A_CORE
Ever [id number] - left v/m, CT 6/13. Pt has upcoming appt for 6/16 @ 256 w/ Dr. Rocha (breast surgery referral).

## 2025-06-16 ENCOUNTER — APPOINTMENT (OUTPATIENT)
Dept: SURGERY | Facility: CLINIC | Age: 45
End: 2025-06-16
Payer: COMMERCIAL

## 2025-06-16 VITALS
SYSTOLIC BLOOD PRESSURE: 124 MMHG | DIASTOLIC BLOOD PRESSURE: 70 MMHG | WEIGHT: 147 LBS | TEMPERATURE: 97 F | HEIGHT: 59 IN | HEART RATE: 100 BPM | OXYGEN SATURATION: 99 % | BODY MASS INDEX: 29.64 KG/M2

## 2025-06-16 PROCEDURE — 99213 OFFICE O/P EST LOW 20 MIN: CPT

## 2025-06-19 ENCOUNTER — APPOINTMENT (OUTPATIENT)
Dept: RHEUMATOLOGY | Facility: CLINIC | Age: 45
End: 2025-06-19
Payer: SELF-PAY

## 2025-06-19 VITALS
DIASTOLIC BLOOD PRESSURE: 70 MMHG | HEART RATE: 67 BPM | TEMPERATURE: 98.1 F | OXYGEN SATURATION: 97 % | SYSTOLIC BLOOD PRESSURE: 120 MMHG | BODY MASS INDEX: 29.69 KG/M2 | WEIGHT: 147 LBS

## 2025-06-19 PROCEDURE — 99204 OFFICE O/P NEW MOD 45 MIN: CPT

## 2025-06-19 RX ORDER — DICLOXACILLIN SODIUM 500 MG/1
500 CAPSULE ORAL 4 TIMES DAILY
Qty: 56 | Refills: 0 | Status: DISCONTINUED | COMMUNITY
Start: 2025-06-16 | End: 2025-06-19

## 2025-06-19 RX ORDER — PREDNISONE 10 MG/1
10 TABLET ORAL DAILY
Qty: 60 | Refills: 1 | Status: ACTIVE | COMMUNITY
Start: 2025-06-19 | End: 1900-01-01

## 2025-06-20 ENCOUNTER — EMERGENCY (EMERGENCY)
Facility: HOSPITAL | Age: 45
LOS: 0 days | Discharge: HOME | End: 2025-06-21
Attending: EMERGENCY MEDICINE
Payer: MEDICAID

## 2025-06-20 VITALS
WEIGHT: 151.02 LBS | SYSTOLIC BLOOD PRESSURE: 171 MMHG | HEIGHT: 59 IN | RESPIRATION RATE: 18 BRPM | DIASTOLIC BLOOD PRESSURE: 111 MMHG | TEMPERATURE: 98 F | OXYGEN SATURATION: 99 % | HEART RATE: 74 BPM

## 2025-06-20 LAB
BASOPHILS # BLD AUTO: 0.04 K/UL — SIGNIFICANT CHANGE UP (ref 0–0.2)
BASOPHILS NFR BLD AUTO: 0.4 % — SIGNIFICANT CHANGE UP (ref 0–1)
EOSINOPHIL # BLD AUTO: 0.04 K/UL — SIGNIFICANT CHANGE UP (ref 0–0.7)
EOSINOPHIL NFR BLD AUTO: 0.4 % — SIGNIFICANT CHANGE UP (ref 0–8)
HCT VFR BLD CALC: 40.4 % — SIGNIFICANT CHANGE UP (ref 37–47)
HGB BLD-MCNC: 13.6 G/DL — SIGNIFICANT CHANGE UP (ref 12–16)
IMM GRANULOCYTES NFR BLD AUTO: 0.2 % — SIGNIFICANT CHANGE UP (ref 0.1–0.3)
LYMPHOCYTES # BLD AUTO: 1.69 K/UL — SIGNIFICANT CHANGE UP (ref 1.2–3.4)
LYMPHOCYTES # BLD AUTO: 17.6 % — LOW (ref 20.5–51.1)
MCHC RBC-ENTMCNC: 29.6 PG — SIGNIFICANT CHANGE UP (ref 27–31)
MCHC RBC-ENTMCNC: 33.7 G/DL — SIGNIFICANT CHANGE UP (ref 32–37)
MCV RBC AUTO: 87.8 FL — SIGNIFICANT CHANGE UP (ref 81–99)
MONOCYTES # BLD AUTO: 0.78 K/UL — HIGH (ref 0.1–0.6)
MONOCYTES NFR BLD AUTO: 8.1 % — SIGNIFICANT CHANGE UP (ref 1.7–9.3)
NEUTROPHILS # BLD AUTO: 7.04 K/UL — HIGH (ref 1.4–6.5)
NEUTROPHILS NFR BLD AUTO: 73.3 % — SIGNIFICANT CHANGE UP (ref 42.2–75.2)
NRBC BLD AUTO-RTO: 0 /100 WBCS — SIGNIFICANT CHANGE UP (ref 0–0)
PLATELET # BLD AUTO: 263 K/UL — SIGNIFICANT CHANGE UP (ref 130–400)
PMV BLD: 12.3 FL — HIGH (ref 7.4–10.4)
RBC # BLD: 4.6 M/UL — SIGNIFICANT CHANGE UP (ref 4.2–5.4)
RBC # FLD: 13.6 % — SIGNIFICANT CHANGE UP (ref 11.5–14.5)
WBC # BLD: 9.61 K/UL — SIGNIFICANT CHANGE UP (ref 4.8–10.8)
WBC # FLD AUTO: 9.61 K/UL — SIGNIFICANT CHANGE UP (ref 4.8–10.8)

## 2025-06-20 PROCEDURE — 36415 COLL VENOUS BLD VENIPUNCTURE: CPT

## 2025-06-20 PROCEDURE — 83605 ASSAY OF LACTIC ACID: CPT

## 2025-06-20 PROCEDURE — 99284 EMERGENCY DEPT VISIT MOD MDM: CPT | Mod: 25

## 2025-06-20 PROCEDURE — 96365 THER/PROPH/DIAG IV INF INIT: CPT

## 2025-06-20 PROCEDURE — 84703 CHORIONIC GONADOTROPIN ASSAY: CPT

## 2025-06-20 PROCEDURE — 85025 COMPLETE CBC W/AUTO DIFF WBC: CPT

## 2025-06-20 PROCEDURE — 80053 COMPREHEN METABOLIC PANEL: CPT

## 2025-06-20 PROCEDURE — 96375 TX/PRO/DX INJ NEW DRUG ADDON: CPT

## 2025-06-20 PROCEDURE — 87040 BLOOD CULTURE FOR BACTERIA: CPT

## 2025-06-20 PROCEDURE — 99285 EMERGENCY DEPT VISIT HI MDM: CPT

## 2025-06-20 RX ORDER — SODIUM CHLORIDE 9 G/1000ML
1000 INJECTION, SOLUTION INTRAVENOUS ONCE
Refills: 0 | Status: COMPLETED | OUTPATIENT
Start: 2025-06-20 | End: 2025-06-20

## 2025-06-20 RX ORDER — VANCOMYCIN HCL IN 5 % DEXTROSE 1.5G/250ML
1000 PLASTIC BAG, INJECTION (ML) INTRAVENOUS ONCE
Refills: 0 | Status: COMPLETED | OUTPATIENT
Start: 2025-06-20 | End: 2025-06-20

## 2025-06-20 RX ORDER — KETOROLAC TROMETHAMINE 30 MG/ML
30 INJECTION, SOLUTION INTRAMUSCULAR; INTRAVENOUS ONCE
Refills: 0 | Status: DISCONTINUED | OUTPATIENT
Start: 2025-06-20 | End: 2025-06-20

## 2025-06-20 RX ADMIN — SODIUM CHLORIDE 1000 MILLILITER(S): 9 INJECTION, SOLUTION INTRAVENOUS at 23:36

## 2025-06-20 RX ADMIN — Medication 250 MILLIGRAM(S): at 23:36

## 2025-06-20 RX ADMIN — KETOROLAC TROMETHAMINE 30 MILLIGRAM(S): 30 INJECTION, SOLUTION INTRAMUSCULAR; INTRAVENOUS at 23:36

## 2025-06-20 NOTE — ED PROVIDER NOTE - NSFOLLOWUPCLINICS_GEN_ALL_ED_FT
[facility]  Medicine  [address], [place]   Phone: [phone]  Fax:   Follow Up Time: 7-10 Days

## 2025-06-20 NOTE — ED PROVIDER NOTE - IV ALTEPLASE EXCL ABS HIDDEN
PAT - SURGICAL PRE-ADMISSION INSTRUCTIONS    NAME:  Kela Oppenheim                                                          TODAY'S DATE:  8/15/2018    SURGERY DATE:  8/20/2018                                  SURGERY ARRIVAL TIME:   0800    1. Do NOT eat or drink anything, including candy or gum, after MIDNIGHT on 8/19/18 , unless you have specific instructions from your Surgeon or Anesthesia Provider to do so. 2. No smoking on the day of surgery. 3. No alcohol 24 hours prior to the day of surgery. 4. No recreational drugs for one week prior to the day of surgery. 5. Leave all valuables, including money/purse, at home. 6. Remove all jewelry, nail polish, makeup (including mascara); no lotions, powders, deodorant, or perfume/cologne/after shave. 7. Glasses/Contact lenses and Dentures may be worn to the hospital.  They will be removed prior to surgery. 8. Call your doctor if symptoms of a cold or illness develop within 24 ours prior to surgery. 9. AN ADULT MUST DRIVE YOU HOME AFTER OUTPATIENT SURGERY. 10. If you are having an OUTPATIENT procedure, please make arrangements for a responsible adult to be with you for 24 hours after your surgery. 11. If you are admitted to the hospital, you will be assigned to a bed after surgery is complete. Normally a family member will not be able to see you until you are in your assigned bed. 15. Family is encouraged to accompany you to the hospital.  We ask visitors in the treatment area to be limited to ONE person at a time to ensure patient privacy. EXCEPTIONS WILL BE MADE AS NEEDED. 15. Children under 12 are discouraged from entering the treatment area and need to be supervised by an adult when in the waiting room. Special Instructions:     Take these medications the morning of surgery with a sip of water:  AMLODIPINE & XANAX    Patient Prep:    use CHG solution    These surgical instructions were reviewed with PATIENT during the PAT VISIT   A printed copy of the instructions was provided to PATIENT. Directions: On the morning of surgery, please go to the [address] [facility]. Enter the building from the [facility] entrance, 1st floor (next to the Emergency Room entrance). Take the elevator to the 2nd floor. Sign in at the Registration Desk.     If you have any questions and/or concerns, please do not hesitate to call:  (Prior to the day of surgery)  [facility] unit:  [phone]  (Day of surgery)  [facility] unit:  [phone] show

## 2025-06-20 NOTE — ED PROVIDER NOTE - NSFOLLOWUPINSTRUCTIONS_ED_ALL_ED_FT
Our Emergency Department Referral Coordinators will be reaching out to you in the next 24-48 hours from 9:00am to 5:00pm with a follow up appointment. Please expect a phone call from the hospital in that time frame. If you do not receive a call or if you have any questions or concerns, you can reach them at   [phone]        Patient education: Common breast problems (The Basics)  View in   language     Written by the doctors and editors at [facility]  What kinds of problems can women have with their breasts?  Women can have different kinds of problems with their breasts. The important thing to know is that for most but not all women, most breast-related problems are not caused by breast cancer. Even so, if you develop any problem with your breasts, see a doctor or nurse to have it checked out.    Some common breast problems include:    ?Breast lumpiness    ?Single breast lump (which doctors call a "mass")    ?Breast pain    ?Breast tenderness    ?Nipple discharge (meaning fluid is leaks from the nipples, such as clear, white, yellow, green, or red fluid)    ?Nipple inversion (meaning the nipples point inward instead of outward) and that is new and has occurred in just one breast    ?Changes in the skin of the breast, such as redness or puckering    These problems can happen in women of all ages. If you develop any of these problems, see your doctor or nurse. Breast problems are not usually an emergency, but you should get checked out as soon as possible. If there is something serious going on, it's important to find out quickly. Your doctor or nurse might be able to tell what's happening just by doing an exam. If not, he or she can order some tests, or send you to a specialist.    Which tests might I need?  Your doctor or nurse will decide which tests you need based on your individual situation. Common tests used to evaluate breast problems are listed below:    ?Breast ultrasound – This is an imaging test that uses sound waves to create pictures of the inside of your breast. Among other things, it can show if a lump is solid or filled with fluid.    ?Breast biopsy – During a biopsy, a doctor takes one or more tiny samples of suspicious breast tissue using a needle. The samples then go to the lab to be checked for cancer or other problems.    ?Mammogram – Mammograms are special X-rays of the breast. They can help doctors find breast cancer.    What could be causing the problem?  The breast symptoms listed above could be caused by a number of problems, most of which are not serious. For example, normal hormone changes in a woman's monthly cycle can sometimes cause breast pain or even lumps that turn out to be nothing. Still, new breast symptoms can be a sign of cancer, so it's important to see a doctor if you develop any symptom.

## 2025-06-20 NOTE — ED PROVIDER NOTE - ATTENDING APP SHARED VISIT CONTRIBUTION OF CARE
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  Patient denies fever but reports chills.  Denies any active drainage.  Denies shortness of breath, dizziness, nausea vomiting.  VSS, non toxic appearing, NAD, Head NCAT, MMM, neck supple, normal ROM, normal s1s2, lungs ctab, right breast with an obvious 6 cm fluctuant area superior to the areola, abd s/nt/nd, no guarding or rebound, extremities wnl, AAO x 3, GCS 15, neuro grossly normal. No acute skin lesions. Plan is labs, surgical consult for past abscess, IV antibiotics and manage accordingly.     Fabián used

## 2025-06-20 NOTE — ED PROVIDER NOTE - OBJECTIVE STATEMENT
44 yodl female to Ed with no sigif med hx Nicaraguan speaking #[id number] bassam cummingsp; pt c/o right breast pain x 1 day - pt has issues in past with right breast - dx with infection was given abx(dicloxacillin); pt also seen at breast clinic yesterday with no specific dx; pt denies fever, chills, cough, n/v, back pain

## 2025-06-20 NOTE — ED ADULT NURSE NOTE - NSICDXPASTMEDICALHX_GEN_ALL_CORE_FT
Hide Additional Notes?: No Detail Level: Detailed Additional Notes (Optional): Patient still concerned she has ringworm. Reassured no evidence of ringworm. \\nPt states she has OCD, which has been exacerbated by recent OR rash and some other health issues.\\nMainly presents for reassurance. PAST MEDICAL HISTORY:  Type 2 diabetes mellitus Dx 3 yrs ago, never on medication

## 2025-06-20 NOTE — ED PROVIDER NOTE - PATIENT PORTAL LINK FT
You can access the FollowMyHealth Patient Portal offered by [facility] by registering at the following website: [email or web address]. By joining [employer]’s FollowMyHealth portal, you will also be able to view your health information using other applications (apps) compatible with our system.

## 2025-06-20 NOTE — ED ADULT NURSE NOTE - NSICDXNOFAMILYHX_GEN_ALL_CORE
Patient Instructions:    Additional Testing  -Complete blood work ordered by PCP at your convenience  -I am recommending further Neurodiagnostic workup at this time:  MRI Brain ordered    Headache/migraine treatment:     Prevention  -To take every day to help prevent headaches - not to take at the time of headache:  -Start Amitriptyline 10mg nightly for one week. Can increase to 20mg thereafter. Please let me know immediately if you experience any side effects or have any worsening anxiety/depression.  Please present to the nearest ER with thoughts of hurting yourself or someone else  -Over the counter preventive supplements for headaches/migraines (if you try, try for 3 months straight):  -Magnesium 400mg daily (If any diarrhea or upset stomach, decrease dose as tolerated)  -Riboflavin (Vitamin B2) 400mg daily (may make your urine bright/neon yellow)  - All supplements can be purchased online    Abortive  -For immediate treatment of a headache/migraine  -For your more moderate to severe migraines take this medication as early as possible:  -Start Imitrex 100mg at the onset of a headache.  May take a second one two hours later if not completely headache free.   -It is ok to take ibuprofen, acetaminophen or naproxen (Advil, Tylenol,  Aleve, Excedrin) if they help your headaches you should limit these to No more than 3 times a week to avoid medication overuse/rebound headaches.     Lifestyle Recommendations:    -Remain well-hydrated drinking at least 48 to 64 ounces of noncaffeinated beverages per day in addition to anything caffeinated.    -Getting adequate rest is also very important for migraine prevention (aim for 7-8 hours per night).     -Regular exercise is also beneficial for headache prevention.  I would encourage at the least 5 days of physical exercise weekly for at least 30 minutes.   -I would like for them to keep track of their migraines using an application on their phone or calendar as they see fit.  Phone applications: Migraine Luis Alfredo or Migraine Diary.    Education and Follow-up:    -Please call or send a [employer] message with any questions or concerns. Please present to the emergency room with any concerning symptoms such as: worst headache of your life, sudden painless loss of vision or double vision, difficulty speaking or swallowing, vertigo/room spinning that does not quickly resolve, or weakness/numbness/loss of coordination affecting 1 side of the face or body.  -Follow up in 2 months or sooner if needed.     <-- Click to add NO pertinent Family History

## 2025-06-20 NOTE — ED PROVIDER NOTE - NS ED ROS FT
Constitutional: No fever, chills.  Eyes: No visual changes.  ENT: No hearing changes.  Neck: No neck pain or stiffness.  Cardiovascular: No chest pain, palpitations, edema.  Breast pain: see hpi  Pulmonary: No SOB, cough. No hemoptysis.  Abdominal:  No nausea, vomiting, diarrhea.  : No dysuria, frequency.  Neuro: No headache, syncope, dizziness.  MS: No back pain. No calf pain/swelling.  Psych: No suicidal ideations.

## 2025-06-21 VITALS
HEART RATE: 85 BPM | OXYGEN SATURATION: 100 % | SYSTOLIC BLOOD PRESSURE: 147 MMHG | TEMPERATURE: 98 F | RESPIRATION RATE: 18 BRPM | DIASTOLIC BLOOD PRESSURE: 87 MMHG

## 2025-06-21 LAB
ALBUMIN SERPL ELPH-MCNC: 4.4 G/DL — SIGNIFICANT CHANGE UP (ref 3.5–5.2)
ALP SERPL-CCNC: 105 U/L — SIGNIFICANT CHANGE UP (ref 30–115)
ALT FLD-CCNC: 19 U/L — SIGNIFICANT CHANGE UP (ref 0–41)
ANION GAP SERPL CALC-SCNC: 10 MMOL/L — SIGNIFICANT CHANGE UP (ref 7–14)
AST SERPL-CCNC: 13 U/L — SIGNIFICANT CHANGE UP (ref 0–41)
BILIRUB SERPL-MCNC: <0.2 MG/DL — SIGNIFICANT CHANGE UP (ref 0.2–1.2)
BUN SERPL-MCNC: 11 MG/DL — SIGNIFICANT CHANGE UP (ref 10–20)
CALCIUM SERPL-MCNC: 8.7 MG/DL — SIGNIFICANT CHANGE UP (ref 8.4–10.5)
CHLORIDE SERPL-SCNC: 102 MMOL/L — SIGNIFICANT CHANGE UP (ref 98–110)
CO2 SERPL-SCNC: 25 MMOL/L — SIGNIFICANT CHANGE UP (ref 17–32)
CREAT SERPL-MCNC: 0.6 MG/DL — LOW (ref 0.7–1.5)
EGFR: 114 ML/MIN/1.73M2 — SIGNIFICANT CHANGE UP
EGFR: 114 ML/MIN/1.73M2 — SIGNIFICANT CHANGE UP
GLUCOSE SERPL-MCNC: 307 MG/DL — HIGH (ref 70–99)
HCG SERPL QL: NEGATIVE — SIGNIFICANT CHANGE UP
LACTATE SERPL-SCNC: 1.4 MMOL/L — SIGNIFICANT CHANGE UP (ref 0.7–2)
POTASSIUM SERPL-MCNC: 3.8 MMOL/L — SIGNIFICANT CHANGE UP (ref 3.5–5)
POTASSIUM SERPL-SCNC: 3.8 MMOL/L — SIGNIFICANT CHANGE UP (ref 3.5–5)
PROT SERPL-MCNC: 7.7 G/DL — SIGNIFICANT CHANGE UP (ref 6–8)
SODIUM SERPL-SCNC: 137 MMOL/L — SIGNIFICANT CHANGE UP (ref 135–146)

## 2025-06-21 RX ORDER — DEXAMETHASONE 0.5 MG/1
10 TABLET ORAL ONCE
Refills: 0 | Status: COMPLETED | OUTPATIENT
Start: 2025-06-21 | End: 2025-06-21

## 2025-06-21 RX ORDER — DIPHENHYDRAMINE HCL 12.5MG/5ML
25 ELIXIR ORAL ONCE
Refills: 0 | Status: COMPLETED | OUTPATIENT
Start: 2025-06-21 | End: 2025-06-21

## 2025-06-21 RX ADMIN — DEXAMETHASONE 10 MILLIGRAM(S): 0.5 TABLET ORAL at 00:35

## 2025-06-21 RX ADMIN — Medication 25 MILLIGRAM(S): at 00:35

## 2025-06-21 RX ADMIN — Medication 1000 MILLIGRAM(S): at 00:27

## 2025-06-21 RX ADMIN — SODIUM CHLORIDE 1000 MILLILITER(S): 9 INJECTION, SOLUTION INTRAVENOUS at 00:47

## 2025-06-21 RX ADMIN — KETOROLAC TROMETHAMINE 30 MILLIGRAM(S): 30 INJECTION, SOLUTION INTRAMUSCULAR; INTRAVENOUS at 03:02

## 2025-06-21 NOTE — CONSULT NOTE ADULT - ASSESSMENT
ASSESSMENT:  Patient is a 45 y/o F with past medical history of DM and tissue biopsy diagnosed chronic granulomatous mastitis who presents to the ED with chief complaint of 1 week history of worsening R breast pain. Patient and family are Vietnamese speaking and  #[id number] used to obtain clinical history. Patient states that she was seen by surgical team, Dr. Rocha, in office x 4 days prior and was prescribed a course of steriods and antibiotics which she has been taking for 2 days without improvement in symptoms. Patient states that pain and swelling of R breast is unchanged since prior to office visit. Patient denies any new discharge from the breast or nipples, fevers, chills. Patient denies chest pain, SOB, cough, wheeze, abd pain    Breast surgery consulted to R/O breast abscess. Physical exam findings, imaging, and labs as documented above. Patient with known history of tissue confirmed chronic granulomatous mastitis for which the patient is well known to breast surgery service with close follow up. Patient recently started on course of steroid antiinflammatory regimen as well as instructions to follow up with rheumatology team. Patient denies currently taking any previously recommended pain control mediations, and pain has not resolved. Patient currently afebrile, without leukocytosis, and without active drainage of R breast, unlikely superimposed R breast abcesss. Patient noted to have possible allergic reaction on Exam in ED to administered Abx, having new onset pruritis and posterior neck swelling.    PLAN:  - No acute breast surgery intervention, no discernable superimposed drainable collection on exam  - Recommend patient start pain regimen should take tylenol/motrin as needed for pain, would recommend tramadol as needed for severe breakthrough pain  - Continue previously prescribed steroid therapy  - Patient requires follow up with rheumatology team as an outpatient for further management of anti-inflamatory regimen  - Patient should follow up in 2 weeks with breast surgeon Dr. Rocha  - Monitor patient for worsening allergic symptoms in ED, per ED  - Care/Dispo per ED        Above plan discussed with Attending Surgeon Dr. Rocha , patient, patient family, and Primary team  06-21-25 @ 01:04

## 2025-06-21 NOTE — CONSULT NOTE ADULT - SUBJECTIVE AND OBJECTIVE BOX
BREAST SURGERY CONSULT NOTE    Patient: CINTHIA LOBATO , 44y ([birth year])Female   MRN: [id number]  Location: [facility] ED  Visit: 06-20-25 Emergency  Date: 06-21-25 @ 01:04    HPI: Patient is a 43 y/o F with past medical history of DM and tissue biopsy diagnosed chronic granulomatous mastitis who presents to the ED with chief complaint of 1 week history of worsening R breast pain. Patient and family are Belizean speaking and  #[id number] used to obtain clinical history. Patient states that she was seen by surgical team, Dr. Rocha, in office x 4 days prior and was prescribed a course of steriods and antibiotics which she has been taking for 2 days without improvement in symptoms. Patient states that pain and swelling of R breast is unchanged since prior to office visit. Patient denies any new discharge from the breast or nipples, fevers, chills. Patient denies chest pain, SOB, cough, wheeze, abd pain    PAST MEDICAL & SURGICAL HISTORY:  Type 2 diabetes mellitus  Dx 3 yrs ago, never on medication  No significant past surgical history      Home Medications:  Prenatal Multivitamins with Folic Acid 1 mg oral tablet: 1 tab(s) orally once a day (27 Oct 2021 06:47)        VITALS:  T(F): 97.9 (06-21-25 @ 00:58), Max: 98.3 (06-21-25 @ 00:49)  HR: 85 (06-21-25 @ 00:58) (74 - 95)  BP: 147/87 (06-21-25 @ 00:58) (147/87 - 177/94)  RR: 18 (06-21-25 @ 00:58) (18 - 18)  SpO2: 100% (06-21-25 @ 00:58) (99% - 100%)    PHYSICAL EXAM:  General: NAD, calm and cooperative  HEENT: NCAT, Moderate swelling to posterior neck  Cardiac: Extremities well perfused  Respiratory: Normal respiratory effort  Abdomen: Soft, non-distended, non-tender, no rebound, no guarding.  Breast: R breast with swelling and erythema lateral/superior to areola  Musculoskeletal: Compartments soft  Neuro: Sensation grossly intact and equal throughout, no focal deficits      MEDICATIONS  (STANDING):    MEDICATIONS  (PRN):      LAB/STUDIES:                        13.6   9.61  )-----------( 263      ( 20 Jun 2025 23:40 )             40.4     06-20    137  |  102  |  11  ----------------------------<  307[H]  3.8   |  25  |  0.6[L]    Ca    8.7      20 Jun 2025 23:40    TPro  7.7  /  Alb  4.4  /  TBili  <0.2  /  DBili  x   /  AST  13  /  ALT  19  /  AlkPhos  105  06-20      LIVER FUNCTIONS - ( 20 Jun 2025 23:40 )  Alb: 4.4 g/dL / Pro: 7.7 g/dL / ALK PHOS: 105 U/L / ALT: 19 U/L / AST: 13 U/L / GGT: x           Urinalysis Basic - ( 20 Jun 2025 23:40 )    Color: x / Appearance: x / SG: x / pH: x  Gluc: 307 mg/dL / Ketone: x  / Bili: x / Urobili: x   Blood: x / Protein: x / Nitrite: x   Leuk Esterase: x / RBC: x / WBC x   Sq Epi: x / Non Sq Epi: x / Bacteria: x

## 2025-06-23 LAB
ALBUMIN SERPL ELPH-MCNC: 4.6 G/DL
ALP BLD-CCNC: 104 U/L
ALT SERPL-CCNC: 17 U/L
ANION GAP SERPL CALC-SCNC: 13 MMOL/L
AST SERPL-CCNC: 10 U/L
BASOPHILS # BLD AUTO: 0.05 K/UL
BASOPHILS NFR BLD AUTO: 0.5 %
BILIRUB SERPL-MCNC: 0.2 MG/DL
BUN SERPL-MCNC: 7 MG/DL
CALCIUM SERPL-MCNC: 9 MG/DL
CHLORIDE SERPL-SCNC: 104 MMOL/L
CHOLEST SERPL-MCNC: 188 MG/DL
CO2 SERPL-SCNC: 23 MMOL/L
CREAT SERPL-MCNC: 0.6 MG/DL
CRP SERPL-MCNC: <3 MG/L
EGFRCR SERPLBLD CKD-EPI 2021: 114 ML/MIN/1.73M2
EOSINOPHIL # BLD AUTO: 0.1 K/UL
EOSINOPHIL NFR BLD AUTO: 1 %
ERYTHROCYTE [SEDIMENTATION RATE] IN BLOOD BY WESTERGREN METHOD: 26 MM/HR
ESTIMATED AVERAGE GLUCOSE: 169 MG/DL
GLUCOSE SERPL-MCNC: 85 MG/DL
HBA1C MFR BLD HPLC: 7.5 %
HCT VFR BLD CALC: 40.7 %
HDLC SERPL-MCNC: 48 MG/DL
HGB BLD-MCNC: 13.3 G/DL
IMM GRANULOCYTES NFR BLD AUTO: 0.2 %
LDLC SERPL-MCNC: 123 MG/DL
LYMPHOCYTES # BLD AUTO: 2.87 K/UL
LYMPHOCYTES NFR BLD AUTO: 30.1 %
MAN DIFF?: NORMAL
MCHC RBC-ENTMCNC: 29.3 PG
MCHC RBC-ENTMCNC: 32.7 G/DL
MCV RBC AUTO: 89.6 FL
MONOCYTES # BLD AUTO: 0.66 K/UL
MONOCYTES NFR BLD AUTO: 6.9 %
NEUTROPHILS # BLD AUTO: 5.83 K/UL
NEUTROPHILS NFR BLD AUTO: 61.3 %
NONHDLC SERPL-MCNC: 140 MG/DL
PLATELET # BLD AUTO: 253 K/UL
PMV BLD AUTO: 0 /100 WBCS
PMV BLD: 12.4 FL
POTASSIUM SERPL-SCNC: 3.7 MMOL/L
PROT SERPL-MCNC: 7.9 G/DL
RBC # BLD: 4.54 M/UL
RBC # FLD: 13.8 %
SODIUM SERPL-SCNC: 140 MMOL/L
TRIGL SERPL-MCNC: 91 MG/DL
WBC # FLD AUTO: 9.53 K/UL

## 2025-06-23 RX ORDER — METHOTREXATE 2.5 MG/1
2.5 TABLET ORAL
Qty: 24 | Refills: 3 | Status: ACTIVE | COMMUNITY
Start: 2025-06-23 | End: 1900-01-01

## 2025-06-23 RX ORDER — FOLIC ACID 1 MG/1
1 TABLET ORAL DAILY
Qty: 30 | Refills: 3 | Status: ACTIVE | COMMUNITY
Start: 2025-06-23 | End: 1900-01-01

## 2025-06-24 NOTE — CHART NOTE - NSCHARTNOTEFT_GEN_A_CORE
Lyric #[id number], left vm 6/23 - DK /  Veronica #[id number], left  6/24 - DK (Rheum & Breast surg Referral)

## 2025-06-26 LAB
CULTURE RESULTS: SIGNIFICANT CHANGE UP
CULTURE RESULTS: SIGNIFICANT CHANGE UP
SPECIMEN SOURCE: SIGNIFICANT CHANGE UP
SPECIMEN SOURCE: SIGNIFICANT CHANGE UP

## 2025-07-03 LAB
ALBUMIN SERPL ELPH-MCNC: 4.3 G/DL
ALP BLD-CCNC: 90 U/L
ALT SERPL-CCNC: 16 U/L
ANION GAP SERPL CALC-SCNC: 15 MMOL/L
AST SERPL-CCNC: 10 U/L
BASOPHILS # BLD AUTO: 0.04 K/UL
BASOPHILS NFR BLD AUTO: 0.4 %
BILIRUB SERPL-MCNC: 0.2 MG/DL
BUN SERPL-MCNC: 9 MG/DL
CALCIUM SERPL-MCNC: 8.8 MG/DL
CHLORIDE SERPL-SCNC: 103 MMOL/L
CO2 SERPL-SCNC: 20 MMOL/L
CREAT SERPL-MCNC: 0.5 MG/DL
CRP SERPL-MCNC: <3 MG/L
EGFRCR SERPLBLD CKD-EPI 2021: 119 ML/MIN/1.73M2
EOSINOPHIL # BLD AUTO: 0.12 K/UL
EOSINOPHIL NFR BLD AUTO: 1.2 %
ERYTHROCYTE [SEDIMENTATION RATE] IN BLOOD BY WESTERGREN METHOD: 27 MM/HR
GLUCOSE SERPL-MCNC: 98 MG/DL
HCT VFR BLD CALC: 42.1 %
HGB BLD-MCNC: 13.5 G/DL
IMM GRANULOCYTES NFR BLD AUTO: 0.3 %
LYMPHOCYTES # BLD AUTO: 2.18 K/UL
LYMPHOCYTES NFR BLD AUTO: 21.2 %
MAN DIFF?: NORMAL
MCHC RBC-ENTMCNC: 29.1 PG
MCHC RBC-ENTMCNC: 32.1 G/DL
MCV RBC AUTO: 90.7 FL
MONOCYTES # BLD AUTO: 0.93 K/UL
MONOCYTES NFR BLD AUTO: 9 %
NEUTROPHILS # BLD AUTO: 6.99 K/UL
NEUTROPHILS NFR BLD AUTO: 67.9 %
PLATELET # BLD AUTO: 237 K/UL
PMV BLD AUTO: 0 /100 WBCS
PMV BLD: 13 FL
POTASSIUM SERPL-SCNC: 4 MMOL/L
PROT SERPL-MCNC: 7.7 G/DL
RBC # BLD: 4.64 M/UL
RBC # FLD: 14.8 %
SODIUM SERPL-SCNC: 138 MMOL/L
WBC # FLD AUTO: 10.29 K/UL

## 2025-07-14 ENCOUNTER — LABORATORY RESULT (OUTPATIENT)
Age: 45
End: 2025-07-14

## 2025-07-14 ENCOUNTER — APPOINTMENT (OUTPATIENT)
Dept: SURGERY | Facility: CLINIC | Age: 45
End: 2025-07-14
Payer: COMMERCIAL

## 2025-07-14 VITALS
HEIGHT: 59 IN | BODY MASS INDEX: 29.43 KG/M2 | HEART RATE: 70 BPM | SYSTOLIC BLOOD PRESSURE: 120 MMHG | DIASTOLIC BLOOD PRESSURE: 80 MMHG | WEIGHT: 146 LBS | OXYGEN SATURATION: 99 %

## 2025-07-14 PROBLEM — L02.91 ABSCESS: Status: ACTIVE | Noted: 2025-07-14

## 2025-07-14 PROCEDURE — 10060 I&D ABSCESS SIMPLE/SINGLE: CPT

## 2025-07-14 RX ORDER — DOXYCYCLINE HYCLATE 100 MG/1
100 CAPSULE ORAL TWICE DAILY
Qty: 20 | Refills: 0 | Status: ACTIVE | COMMUNITY
Start: 2025-07-14 | End: 1900-01-01

## 2025-07-23 ENCOUNTER — APPOINTMENT (OUTPATIENT)
Dept: BREAST CENTER | Facility: CLINIC | Age: 45
End: 2025-07-23

## 2025-08-01 ENCOUNTER — APPOINTMENT (OUTPATIENT)
Dept: SURGERY | Facility: CLINIC | Age: 45
End: 2025-08-01
Payer: COMMERCIAL

## 2025-08-01 VITALS
BODY MASS INDEX: 29.23 KG/M2 | SYSTOLIC BLOOD PRESSURE: 130 MMHG | DIASTOLIC BLOOD PRESSURE: 80 MMHG | HEIGHT: 59 IN | HEART RATE: 78 BPM | WEIGHT: 145 LBS | OXYGEN SATURATION: 98 % | TEMPERATURE: 97 F

## 2025-08-01 PROCEDURE — 99213 OFFICE O/P EST LOW 20 MIN: CPT

## 2025-08-25 ENCOUNTER — RX RENEWAL (OUTPATIENT)
Age: 45
End: 2025-08-25

## 2025-09-05 ENCOUNTER — APPOINTMENT (OUTPATIENT)
Dept: RHEUMATOLOGY | Facility: CLINIC | Age: 45
End: 2025-09-05
Payer: COMMERCIAL

## 2025-09-05 VITALS
BODY MASS INDEX: 29.29 KG/M2 | TEMPERATURE: 98.1 F | HEART RATE: 76 BPM | SYSTOLIC BLOOD PRESSURE: 132 MMHG | OXYGEN SATURATION: 97 % | WEIGHT: 145 LBS | DIASTOLIC BLOOD PRESSURE: 80 MMHG

## 2025-09-05 DIAGNOSIS — N61.21 GRANULOMATOUS MASTITIS, RIGHT BREAST: ICD-10-CM

## 2025-09-05 DIAGNOSIS — E11.49 TYPE 2 DIABETES MELLITUS WITH OTHER DIABETIC NEUROLOGICAL COMPLICATION: ICD-10-CM

## 2025-09-05 PROCEDURE — 99214 OFFICE O/P EST MOD 30 MIN: CPT

## 2025-09-05 RX ORDER — METHOTREXATE 2.5 MG/1
2.5 TABLET ORAL
Qty: 96 | Refills: 3 | Status: ACTIVE | COMMUNITY
Start: 2025-09-05 | End: 1900-01-01

## 2025-09-05 RX ORDER — PREDNISONE 5 MG/1
5 TABLET ORAL DAILY
Qty: 180 | Refills: 1 | Status: ACTIVE | COMMUNITY
Start: 2025-09-05 | End: 1900-01-01

## 2025-09-09 ENCOUNTER — RESULT REVIEW (OUTPATIENT)
Age: 45
End: 2025-09-09

## 2025-09-16 ENCOUNTER — APPOINTMENT (OUTPATIENT)
Dept: BREAST CENTER | Facility: CLINIC | Age: 45
End: 2025-09-16